# Patient Record
Sex: MALE | HISPANIC OR LATINO | Employment: UNEMPLOYED | URBAN - NONMETROPOLITAN AREA
[De-identification: names, ages, dates, MRNs, and addresses within clinical notes are randomized per-mention and may not be internally consistent; named-entity substitution may affect disease eponyms.]

---

## 2017-01-16 ENCOUNTER — HOSPITAL ENCOUNTER (OUTPATIENT)
Dept: OTHER | Facility: HOSPITAL | Age: 62
Discharge: HOME | End: 2017-01-16
Attending: NURSE PRACTITIONER | Admitting: NURSE PRACTITIONER

## 2017-07-18 ENCOUNTER — TRANSCRIBE ORDERS (OUTPATIENT)
Dept: CT IMAGING | Facility: HOSPITAL | Age: 62
End: 2017-07-18

## 2017-07-18 DIAGNOSIS — R51.9 INTRACTABLE HEADACHE, UNSPECIFIED CHRONICITY PATTERN, UNSPECIFIED HEADACHE TYPE: Primary | ICD-10-CM

## 2017-07-21 ENCOUNTER — HOSPITAL ENCOUNTER (OUTPATIENT)
Dept: CT IMAGING | Facility: HOSPITAL | Age: 62
Discharge: HOME | End: 2017-07-21
Admitting: NURSE PRACTITIONER

## 2017-07-21 DIAGNOSIS — R51.9 INTRACTABLE HEADACHE, UNSPECIFIED CHRONICITY PATTERN, UNSPECIFIED HEADACHE TYPE: ICD-10-CM

## 2017-07-21 PROCEDURE — 70450 CT HEAD/BRAIN W/O DYE: CPT

## 2017-10-27 DIAGNOSIS — M25.512 LEFT SHOULDER PAIN, UNSPECIFIED CHRONICITY: Primary | ICD-10-CM

## 2018-04-02 ENCOUNTER — HOSPITAL ENCOUNTER (INPATIENT)
Facility: HOSPITAL | Age: 63
LOS: 4 days | Discharge: HOME | End: 2018-04-06
Attending: EMERGENCY MEDICINE | Admitting: FAMILY MEDICINE

## 2018-04-02 ENCOUNTER — APPOINTMENT (OUTPATIENT)
Dept: GENERAL RADIOLOGY | Facility: HOSPITAL | Age: 63
End: 2018-04-02

## 2018-04-02 DIAGNOSIS — R07.9 CHEST PAIN, UNSPECIFIED TYPE: ICD-10-CM

## 2018-04-02 DIAGNOSIS — R35.0 BENIGN PROSTATIC HYPERPLASIA WITH URINARY FREQUENCY: ICD-10-CM

## 2018-04-02 DIAGNOSIS — N40.1 BENIGN PROSTATIC HYPERPLASIA WITH URINARY FREQUENCY: ICD-10-CM

## 2018-04-02 DIAGNOSIS — E87.6 HYPOKALEMIA: ICD-10-CM

## 2018-04-02 DIAGNOSIS — R53.1 GENERAL WEAKNESS: ICD-10-CM

## 2018-04-02 DIAGNOSIS — E87.1 HYPONATREMIA: Primary | ICD-10-CM

## 2018-04-02 PROBLEM — J32.1 SINUSITIS CHRONIC, FRONTAL: Status: ACTIVE | Noted: 2018-04-02

## 2018-04-02 PROBLEM — K21.9 GASTROESOPHAGEAL REFLUX DISEASE: Status: ACTIVE | Noted: 2018-04-02

## 2018-04-02 PROBLEM — I10 HYPERTENSION, BENIGN: Status: ACTIVE | Noted: 2018-04-02

## 2018-04-02 PROBLEM — N13.8 OTHER OBSTRUCTIVE AND REFLUX UROPATHY: Status: ACTIVE | Noted: 2018-04-02

## 2018-04-02 PROBLEM — I10 HYPERTENSION: Status: ACTIVE | Noted: 2018-04-02

## 2018-04-02 LAB
ALBUMIN SERPL-MCNC: 4.2 G/DL (ref 3.4–4.8)
ALBUMIN/GLOB SERPL: 1.6 G/DL (ref 1.1–1.8)
ALP SERPL-CCNC: 65 U/L (ref 38–126)
ALT SERPL W P-5'-P-CCNC: 152 U/L (ref 21–72)
ANION GAP SERPL CALCULATED.3IONS-SCNC: 13 MMOL/L (ref 5–15)
APTT PPP: 25.2 SECONDS (ref 20–40.3)
AST SERPL-CCNC: 97 U/L (ref 17–59)
BASOPHILS # BLD AUTO: 0.02 10*3/MM3 (ref 0–0.2)
BASOPHILS NFR BLD AUTO: 0.3 % (ref 0–2)
BILIRUB SERPL-MCNC: 1.4 MG/DL (ref 0.2–1.3)
BILIRUB UR QL STRIP: NEGATIVE
BUN BLD-MCNC: 8 MG/DL (ref 7–21)
BUN/CREAT SERPL: 13.3 (ref 7–25)
CALCIUM SPEC-SCNC: 8.8 MG/DL (ref 8.4–10.2)
CHLORIDE SERPL-SCNC: 72 MMOL/L (ref 95–110)
CLARITY UR: CLEAR
CO2 SERPL-SCNC: 29 MMOL/L (ref 22–31)
COLOR UR: YELLOW
CREAT BLD-MCNC: 0.6 MG/DL (ref 0.7–1.3)
DEPRECATED RDW RBC AUTO: 37.6 FL (ref 35.1–43.9)
EOSINOPHIL # BLD AUTO: 0.03 10*3/MM3 (ref 0–0.7)
EOSINOPHIL NFR BLD AUTO: 0.5 % (ref 0–7)
ERYTHROCYTE [DISTWIDTH] IN BLOOD BY AUTOMATED COUNT: 11.4 % (ref 11.5–14.5)
GFR SERPL CREATININE-BSD FRML MDRD: 137 ML/MIN/1.73 (ref 49–113)
GFR SERPL CREATININE-BSD FRML MDRD: 165 ML/MIN/1.73 (ref 49–113)
GLOBULIN UR ELPH-MCNC: 2.6 GM/DL (ref 2.3–3.5)
GLUCOSE BLD-MCNC: 102 MG/DL (ref 60–100)
GLUCOSE UR STRIP-MCNC: ABNORMAL MG/DL
HCT VFR BLD AUTO: 37.4 % (ref 39–49)
HGB BLD-MCNC: 14.8 G/DL (ref 13.7–17.3)
HGB UR QL STRIP.AUTO: NEGATIVE
HOLD SPECIMEN: NORMAL
HOLD SPECIMEN: NORMAL
IMM GRANULOCYTES # BLD: 0.03 10*3/MM3 (ref 0–0.02)
IMM GRANULOCYTES NFR BLD: 0.5 % (ref 0–0.5)
INR PPP: 0.89 (ref 0.8–1.2)
KETONES UR QL STRIP: ABNORMAL
LEUKOCYTE ESTERASE UR QL STRIP.AUTO: NEGATIVE
LIPASE SERPL-CCNC: 114 U/L (ref 23–300)
LYMPHOCYTES # BLD AUTO: 1.2 10*3/MM3 (ref 0.6–4.2)
LYMPHOCYTES NFR BLD AUTO: 19.4 % (ref 10–50)
MCH RBC QN AUTO: 35.9 PG (ref 26.5–34)
MCHC RBC AUTO-ENTMCNC: 39.6 G/DL (ref 31.5–36.3)
MCV RBC AUTO: 90.8 FL (ref 80–98)
MONOCYTES # BLD AUTO: 0.76 10*3/MM3 (ref 0–0.9)
MONOCYTES NFR BLD AUTO: 12.3 % (ref 0–12)
NEUTROPHILS # BLD AUTO: 4.14 10*3/MM3 (ref 2–8.6)
NEUTROPHILS NFR BLD AUTO: 67 % (ref 37–80)
NITRITE UR QL STRIP: NEGATIVE
NT-PROBNP SERPL-MCNC: 361 PG/ML (ref 0–900)
OSMOLALITY UR: 223 MOSM/KG (ref 38–1400)
PH UR STRIP.AUTO: 7.5 [PH] (ref 5–9)
PLAT MORPH BLD: NORMAL
PLATELET # BLD AUTO: 197 10*3/MM3 (ref 150–450)
PMV BLD AUTO: 9.8 FL (ref 8–12)
POTASSIUM BLD-SCNC: 2.3 MMOL/L (ref 3.5–5.1)
POTASSIUM BLD-SCNC: 3.1 MMOL/L (ref 3.5–5.1)
PROT SERPL-MCNC: 6.8 G/DL (ref 6.3–8.6)
PROT UR QL STRIP: NEGATIVE
PROTHROMBIN TIME: 11.9 SECONDS (ref 11.1–15.3)
RBC # BLD AUTO: 4.12 10*6/MM3 (ref 4.37–5.74)
RBC MORPH BLD: NORMAL
SODIUM BLD-SCNC: 114 MMOL/L (ref 137–145)
SODIUM BLD-SCNC: 115 MMOL/L (ref 137–145)
SODIUM BLD-SCNC: 117 MMOL/L (ref 137–145)
SODIUM UR-SCNC: 44 MMOL/L (ref 30–90)
SP GR UR STRIP: 1.01 (ref 1–1.03)
TROPONIN I SERPL-MCNC: <0.012 NG/ML
URATE SERPL-MCNC: 3.2 MG/DL (ref 2.5–8.5)
UROBILINOGEN UR QL STRIP: ABNORMAL
WBC MORPH BLD: NORMAL
WBC NRBC COR # BLD: 6.18 10*3/MM3 (ref 3.2–9.8)
WHOLE BLOOD HOLD SPECIMEN: NORMAL
WHOLE BLOOD HOLD SPECIMEN: NORMAL

## 2018-04-02 PROCEDURE — 94760 N-INVAS EAR/PLS OXIMETRY 1: CPT

## 2018-04-02 PROCEDURE — 84484 ASSAY OF TROPONIN QUANT: CPT | Performed by: EMERGENCY MEDICINE

## 2018-04-02 PROCEDURE — 25010000003 POTASSIUM CHLORIDE 10 MEQ/100ML SOLUTION: Performed by: EMERGENCY MEDICINE

## 2018-04-02 PROCEDURE — 99221 1ST HOSP IP/OBS SF/LOW 40: CPT | Performed by: STUDENT IN AN ORGANIZED HEALTH CARE EDUCATION/TRAINING PROGRAM

## 2018-04-02 PROCEDURE — 93010 ELECTROCARDIOGRAM REPORT: CPT | Performed by: INTERNAL MEDICINE

## 2018-04-02 PROCEDURE — 84295 ASSAY OF SERUM SODIUM: CPT | Performed by: INTERNAL MEDICINE

## 2018-04-02 PROCEDURE — 85007 BL SMEAR W/DIFF WBC COUNT: CPT | Performed by: EMERGENCY MEDICINE

## 2018-04-02 PROCEDURE — 83935 ASSAY OF URINE OSMOLALITY: CPT | Performed by: EMERGENCY MEDICINE

## 2018-04-02 PROCEDURE — 85025 COMPLETE CBC W/AUTO DIFF WBC: CPT | Performed by: EMERGENCY MEDICINE

## 2018-04-02 PROCEDURE — 85730 THROMBOPLASTIN TIME PARTIAL: CPT | Performed by: EMERGENCY MEDICINE

## 2018-04-02 PROCEDURE — 85610 PROTHROMBIN TIME: CPT | Performed by: EMERGENCY MEDICINE

## 2018-04-02 PROCEDURE — 99285 EMERGENCY DEPT VISIT HI MDM: CPT

## 2018-04-02 PROCEDURE — 84550 ASSAY OF BLOOD/URIC ACID: CPT | Performed by: INTERNAL MEDICINE

## 2018-04-02 PROCEDURE — 93005 ELECTROCARDIOGRAM TRACING: CPT | Performed by: STUDENT IN AN ORGANIZED HEALTH CARE EDUCATION/TRAINING PROGRAM

## 2018-04-02 PROCEDURE — 83690 ASSAY OF LIPASE: CPT | Performed by: EMERGENCY MEDICINE

## 2018-04-02 PROCEDURE — 84300 ASSAY OF URINE SODIUM: CPT | Performed by: EMERGENCY MEDICINE

## 2018-04-02 PROCEDURE — 83880 ASSAY OF NATRIURETIC PEPTIDE: CPT | Performed by: EMERGENCY MEDICINE

## 2018-04-02 PROCEDURE — 82533 TOTAL CORTISOL: CPT | Performed by: INTERNAL MEDICINE

## 2018-04-02 PROCEDURE — 80053 COMPREHEN METABOLIC PANEL: CPT | Performed by: EMERGENCY MEDICINE

## 2018-04-02 PROCEDURE — 74022 RADEX COMPL AQT ABD SERIES: CPT

## 2018-04-02 PROCEDURE — 81003 URINALYSIS AUTO W/O SCOPE: CPT | Performed by: EMERGENCY MEDICINE

## 2018-04-02 PROCEDURE — 84132 ASSAY OF SERUM POTASSIUM: CPT | Performed by: INTERNAL MEDICINE

## 2018-04-02 RX ORDER — TAMSULOSIN HYDROCHLORIDE 0.4 MG/1
0.4 CAPSULE ORAL EVERY 24 HOURS
Status: DISCONTINUED | OUTPATIENT
Start: 2018-04-03 | End: 2018-04-06 | Stop reason: HOSPADM

## 2018-04-02 RX ORDER — ACETAMINOPHEN 325 MG/1
650 TABLET ORAL EVERY 4 HOURS PRN
Status: DISCONTINUED | OUTPATIENT
Start: 2018-04-02 | End: 2018-04-06 | Stop reason: HOSPADM

## 2018-04-02 RX ORDER — TAMSULOSIN HYDROCHLORIDE 0.4 MG/1
0.4 CAPSULE ORAL EVERY 24 HOURS
COMMUNITY
Start: 2018-01-11

## 2018-04-02 RX ORDER — AMOXICILLIN 500 MG/1
500 CAPSULE ORAL 2 TIMES DAILY
COMMUNITY

## 2018-04-02 RX ORDER — 3% SODIUM CHLORIDE 3 G/100ML
20 INJECTION, SOLUTION INTRAVENOUS ONCE
Status: COMPLETED | OUTPATIENT
Start: 2018-04-02 | End: 2018-04-02

## 2018-04-02 RX ORDER — POTASSIUM CHLORIDE 750 MG/1
40 CAPSULE, EXTENDED RELEASE ORAL ONCE
Status: COMPLETED | OUTPATIENT
Start: 2018-04-02 | End: 2018-04-02

## 2018-04-02 RX ORDER — SODIUM CHLORIDE 9 MG/ML
125 INJECTION, SOLUTION INTRAVENOUS CONTINUOUS
Status: DISCONTINUED | OUTPATIENT
Start: 2018-04-02 | End: 2018-04-05

## 2018-04-02 RX ORDER — POTASSIUM CHLORIDE 7.45 MG/ML
10 INJECTION INTRAVENOUS
Status: COMPLETED | OUTPATIENT
Start: 2018-04-02 | End: 2018-04-02

## 2018-04-02 RX ORDER — ONDANSETRON 4 MG/1
4 TABLET, FILM COATED ORAL EVERY 6 HOURS PRN
Status: DISCONTINUED | OUTPATIENT
Start: 2018-04-02 | End: 2018-04-06 | Stop reason: HOSPADM

## 2018-04-02 RX ORDER — LISINOPRIL AND HYDROCHLOROTHIAZIDE 12.5; 1 MG/1; MG/1
1 TABLET ORAL EVERY 24 HOURS
COMMUNITY
Start: 2017-05-15 | End: 2018-04-06 | Stop reason: HOSPADM

## 2018-04-02 RX ORDER — HYDRALAZINE HYDROCHLORIDE 20 MG/ML
20 INJECTION INTRAMUSCULAR; INTRAVENOUS EVERY 4 HOURS PRN
Status: DISCONTINUED | OUTPATIENT
Start: 2018-04-02 | End: 2018-04-06 | Stop reason: HOSPADM

## 2018-04-02 RX ORDER — PANTOPRAZOLE SODIUM 40 MG/1
40 TABLET, DELAYED RELEASE ORAL
Status: DISCONTINUED | OUTPATIENT
Start: 2018-04-02 | End: 2018-04-06 | Stop reason: HOSPADM

## 2018-04-02 RX ORDER — SODIUM CHLORIDE 0.9 % (FLUSH) 0.9 %
1-10 SYRINGE (ML) INJECTION AS NEEDED
Status: DISCONTINUED | OUTPATIENT
Start: 2018-04-02 | End: 2018-04-06 | Stop reason: HOSPADM

## 2018-04-02 RX ADMIN — SODIUM CHLORIDE 70 ML/HR: 9 INJECTION, SOLUTION INTRAVENOUS at 21:17

## 2018-04-02 RX ADMIN — POTASSIUM CHLORIDE 10 MEQ: 10 INJECTION, SOLUTION INTRAVENOUS at 15:26

## 2018-04-02 RX ADMIN — POTASSIUM CHLORIDE 10 MEQ: 10 INJECTION, SOLUTION INTRAVENOUS at 16:48

## 2018-04-02 RX ADMIN — POTASSIUM CHLORIDE 40 MEQ: 750 CAPSULE, EXTENDED RELEASE ORAL at 15:26

## 2018-04-02 RX ADMIN — PANTOPRAZOLE SODIUM 40 MG: 40 TABLET, DELAYED RELEASE ORAL at 21:20

## 2018-04-02 RX ADMIN — SODIUM CHLORIDE 1000 ML: 900 INJECTION, SOLUTION INTRAVENOUS at 15:18

## 2018-04-02 RX ADMIN — POTASSIUM CHLORIDE 10 MEQ: 10 INJECTION, SOLUTION INTRAVENOUS at 17:20

## 2018-04-02 RX ADMIN — POTASSIUM CHLORIDE 10 MEQ: 10 INJECTION, SOLUTION INTRAVENOUS at 19:00

## 2018-04-02 RX ADMIN — SODIUM CHLORIDE 20 ML/HR: 3 INJECTION, SOLUTION INTRAVENOUS at 15:54

## 2018-04-02 NOTE — H&P
HISTORY AND PHYSICAL  NAME: Barron Ackerman  : 1955  MRN: [id number]    DATE OF ADMISSION: 18    DATE & TIME SEEN: 18 6:51 PM    PCP: ERNST Celestin    CODE STATUS: Full    CHIEF COMPLAINT vomiting    HPI:  Barron Ackerman is a 62 y.o. male for the last 2 weeks has had flulike symptoms including muscle aches, joint aches, cough, nasal congestion, rhinorrhea, cough, with some nausea and vomiting.  He has not been able to keep anything down for for 2 weeks.  He has been able to drink approximately 36 ounces of water a day.  Patient denies any fevers or chills.  He has had a couple episodes of diarrhea.  He becomes lightheaded and dizzy when he stands up, this started yesterday.  For the past 2-3 days he's had worsening cough is causing chest pain.  He went to the doctor's today who did an EKG and sent him to the ER via EMS.  Patient has not tried anything at home medication wise to help with his symptoms.  He reports having lost 12 pounds in the last 2 weeks.    In the ER he was found to have a sodium of 114 and potassium of 2.3.  He was given 40 KCl by mouth and 10 IV.  Patient started on IV 20cc/hr hypertonic saline.nephrology was consulted Nephrology was consulted.  EKG showed normal sinus rhythm, troponin was negative.     CONCURRENT MEDICAL HISTORY:  Past Medical History:   Diagnosis Date   • Hypertension        PAST SURGICAL HISTORY:  Past Surgical History:   Procedure Laterality Date   • HERNIA REPAIR         FAMILY HISTORY:  History reviewed. No pertinent family history.     SOCIAL HISTORY:  Social History     Social History   • Marital status:      Spouse name: N/A   • Number of children: N/A   • Years of education: N/A     Occupational History   • Not on file.     Social History Main Topics   • Smoking status: Current Some Day Smoker   • Smokeless tobacco: Never Used   • Alcohol use Yes      Comment: occassional   • Drug use: No   • Sexual activity: Not on file     Other  Topics Concern   • Not on file     Social History Narrative   • No narrative on file       HOME MEDICATIONS:  Prescriptions Prior to Admission   Medication Sig Dispense Refill Last Dose   • esomeprazole (NEXIUM) 40 MG capsule Take 40 mg by mouth Daily.   4/2/2018 at 0900   • lisinopril-hydrochlorothiazide (PRINZIDE,ZESTORETIC) 10-12.5 MG per tablet Take 1 tablet by mouth Daily.   4/2/2018 at 0900   • tamsulosin (FLOMAX) 0.4 MG capsule 24 hr capsule Take 0.4 mg by mouth Daily.      • amoxicillin (AMOXIL) 500 MG capsule Take 500 mg by mouth 2 (Two) Times a Day. For 10 days. Per pt/wife, started taking about 4 days ago. Pt has only been taking once daily.   Unknown at Unknown time       ALLERGIES:  Review of patient's allergies indicates no known allergies.    REVIEW OF SYSTEMS  Review of Systems   Constitutional: Positive for activity change, appetite change, fatigue and unexpected weight change. Negative for chills, diaphoresis and fever.   HENT: Positive for congestion, rhinorrhea and sneezing. Negative for ear pain and sore throat.    Eyes: Negative for pain, redness, itching and visual disturbance.   Respiratory: Positive for cough. Negative for choking, chest tightness, shortness of breath, wheezing and stridor.    Cardiovascular: Positive for chest pain. Negative for palpitations and leg swelling.   Gastrointestinal: Positive for diarrhea, nausea and vomiting. Negative for abdominal distention, abdominal pain, blood in stool, constipation and rectal pain.   Genitourinary: Negative for difficulty urinating, dysuria, flank pain and frequency.   Skin: Negative for color change and rash.   Neurological: Positive for dizziness, weakness and light-headedness. Negative for tremors, seizures, syncope, facial asymmetry, speech difficulty, numbness and headaches.   Psychiatric/Behavioral: Negative for agitation, confusion, decreased concentration and sleep disturbance. The patient is not nervous/anxious.    All other  systems reviewed and are negative.      PHYSICAL EXAM:  Temp:  [98.5 °F (36.9 °C)] 98.5 °F (36.9 °C)  Heart Rate:  [80-87] 85  Resp:  [18] 18  BP: (101-135)/(61-85) 135/80  Body mass index is 22.74 kg/m².     Physical Exam   Constitutional: He is oriented to person, place, and time. He appears well-developed and well-nourished.  Non-toxic appearance. He does not have a sickly appearance. He appears ill. No distress.   HENT:   Head: Normocephalic and atraumatic.   Right Ear: External ear normal. No lacerations. No swelling or tenderness.   Left Ear: External ear normal. No lacerations. No swelling or tenderness.   Nose: Mucosal edema present. Right sinus exhibits no maxillary sinus tenderness and no frontal sinus tenderness. Left sinus exhibits no maxillary sinus tenderness and no frontal sinus tenderness.   Mouth/Throat: Uvula is midline, oropharynx is clear and moist and mucous membranes are normal.   Eyes: Conjunctivae, EOM and lids are normal. Pupils are equal, round, and reactive to light. No scleral icterus.   Neck: No tracheal deviation present. No thyromegaly present.   Cardiovascular: Normal rate, regular rhythm, normal heart sounds and intact distal pulses.  Exam reveals no gallop, no distant heart sounds and no friction rub.    No murmur heard.  Pulses:       Carotid pulses are 2+ on the right side, and 2+ on the left side.       Radial pulses are 2+ on the right side, and 2+ on the left side.        Dorsalis pedis pulses are 2+ on the right side, and 2+ on the left side.        Posterior tibial pulses are 2+ on the right side, and 2+ on the left side.   Pulmonary/Chest: Effort normal. No accessory muscle usage or stridor. No respiratory distress. He has no decreased breath sounds. He has no wheezes. He has rhonchi in the left lower field. He has no rales. He exhibits no tenderness.   Abdominal: Soft. Bowel sounds are normal. He exhibits no shifting dullness, no distension and no ascites. There is no  tenderness. There is no rigidity, no rebound and no guarding.   Musculoskeletal:        Right lower leg: He exhibits no swelling.        Left lower leg: He exhibits no swelling.        Right foot: There is no swelling.        Left foot: There is no swelling.     Vascular Status -  His right foot exhibits normal foot vasculature  and no edema. His left foot exhibits normal foot vasculature  and no edema.  Lymphadenopathy:     He has no cervical adenopathy.   Neurological: He is alert and oriented to person, place, and time. He has normal strength. No cranial nerve deficit or sensory deficit. He exhibits normal muscle tone. GCS eye subscore is 4. GCS verbal subscore is 5. GCS motor subscore is 6.   Skin: Skin is warm and dry. Capillary refill takes less than 2 seconds. No rash noted. He is not diaphoretic. No erythema. No pallor.   Psychiatric: He has a normal mood and affect. His speech is normal.   Nursing note and vitals reviewed.      DIAGNOSTIC DATA:   Lab Results (last 24 hours)     Procedure Component Value Units Date/Time    Sodium [[id number]]  (Abnormal) Collected:  04/02/18 1804    Specimen:  Blood Updated:  04/02/18 1835     Sodium 115 (C) mmol/L     Uric Acid [[id number]]  (Normal) Collected:  04/02/18 1804    Specimen:  Blood Updated:  04/02/18 1830     Uric Acid 3.2 mg/dL     Osmolality, Urine - Urine, Clean Catch [[id number]]  (Normal) Collected:  04/02/18 1639    Specimen:  Urine from Urine, Clean Catch Updated:  04/02/18 1704     Osmolality, Urine 223 mOsm/kg     Sodium, Urine, Random - Urine, Clean Catch [[id number]]  (Normal) Collected:  04/02/18 1639    Specimen:  Urine from Urine, Clean Catch Updated:  04/02/18 1656     Sodium, Urine 44 mmol/L     Urinalysis With / Culture If Indicated - Urine, Clean Catch [[id number]]  (Abnormal) Collected:  04/02/18 1639    Specimen:  Urine from Urine, Clean Catch Updated:  04/02/18 1643     Color, UA Yellow     Appearance, UA Clear     pH, UA 7.5     Specific Gravity,  UA 1.007     Glucose,  mg/dL (Trace) (A)     Ketones, UA Trace (A)     Bilirubin, UA Negative     Blood, UA Negative     Protein, UA Negative     Leuk Esterase, UA Negative     Nitrite, UA Negative     Urobilinogen, UA 1.0 E.U./dL    Narrative:       Urine microscopic not indicated.    CBC & Differential [[id number]] Collected:  04/02/18 1431    Specimen:  Blood Updated:  04/02/18 1608    Narrative:       The following orders were created for panel order CBC & Differential.  Procedure                               Abnormality         Status                     ---------                               -----------         ------                     Scan Slide[[id number]]                    Normal              Final result               CBC Auto Differential[[id number]]         Abnormal            Final result                 Please view results for these tests on the individual orders.    Scan Slide [[id number]]  (Normal) Collected:  04/02/18 1431    Specimen:  Blood Updated:  04/02/18 1608     RBC Morphology Normal     WBC Morphology Normal     Platelet Morphology Normal    [place] Draw [[id number]] Collected:  04/02/18 1431    Specimen:  Blood Updated:  04/02/18 1546    Narrative:       The following orders were created for panel order [place] Draw.  Procedure                               Abnormality         Status                     ---------                               -----------         ------                     Light Blue Top[[id number]]                                    Final result               Green Top (Gel)[[id number]]                                   Final result               Lavender Top[[id number]]                                      Final result               Gold Top - SST[[id number]]                                    Final result                 Please view results for these tests on the individual orders.    Light Blue Top [[id number]] Collected:  04/02/18 1431    Specimen:  Blood Updated:  04/02/18  1546     Extra Tube hold for add-on     Comment: Auto resulted       Green Top (Gel) [[id number]] Collected:  04/02/18 1431    Specimen:  Blood Updated:  04/02/18 1546     Extra Tube Hold for add-ons.     Comment: Auto resulted.       Lavender Top [[id number]] Collected:  04/02/18 1431    Specimen:  Blood Updated:  04/02/18 1546     Extra Tube hold for add-on     Comment: Auto resulted       Gold Top - SST [[id number]] Collected:  04/02/18 1431    Specimen:  Blood Updated:  04/02/18 1546     Extra Tube Hold for add-ons.     Comment: Auto resulted.       BNP [[id number]]  (Normal) Collected:  04/02/18 1431    Specimen:  Blood Updated:  04/02/18 1519     proBNP 361.0 pg/mL     Troponin [[id number]]  (Normal) Collected:  04/02/18 1431    Specimen:  Blood Updated:  04/02/18 1519     Troponin I <0.012 ng/mL     Comprehensive Metabolic Panel [[id number]]  (Abnormal) Collected:  04/02/18 1431    Specimen:  Blood Updated:  04/02/18 1518     Glucose 102 (H) mg/dL      BUN 8 mg/dL      Creatinine 0.60 (L) mg/dL      Sodium 114 (C) mmol/L      Comment: RERAN TO CONFIRM RESULT        Potassium 2.3 (C) mmol/L      Comment: RERAN TO CONFIRM RESULT        Chloride 72 (L) mmol/L      CO2 29.0 mmol/L      Calcium 8.8 mg/dL      Total Protein 6.8 g/dL      Albumin 4.20 g/dL      ALT (SGPT) 152 (H) U/L      AST (SGOT) 97 (H) U/L      Alkaline Phosphatase 65 U/L      Total Bilirubin 1.4 (H) mg/dL      eGFR Non African Amer 137 (H) mL/min/1.73      eGFR  African Amer 165 (H) mL/min/1.73      Globulin 2.6 gm/dL      A/G Ratio 1.6 g/dL      BUN/Creatinine Ratio 13.3     Anion Gap 13.0 mmol/L     Protime-INR [[id number]]  (Normal) Collected:  04/02/18 1431    Specimen:  Blood Updated:  04/02/18 1516     Protime 11.9 Seconds      INR 0.89    Narrative:       Therapeutic range for most indications is 2.0-3.0 INR,  or 2.5-3.5 for mechanical heart valves.    aPTT [[id number]]  (Normal) Collected:  04/02/18 1431    Specimen:  Blood Updated:  04/02/18 8543      PTT 25.2 seconds     Narrative:       The recommended Heparin therapeutic range is 68-97 seconds.    Lipase [[id number]]  (Normal) Collected:  04/02/18 1431    Specimen:  Blood Updated:  04/02/18 1509     Lipase 114 U/L     CBC Auto Differential [[id number]]  (Abnormal) Collected:  04/02/18 1431    Specimen:  Blood Updated:  04/02/18 1458     WBC 6.18 10*3/mm3      RBC 4.12 (L) 10*6/mm3      Hemoglobin 14.8 g/dL      Hematocrit 37.4 (L) %      MCV 90.8 fL      MCH 35.9 (H) pg      MCHC 39.6 (H) g/dL      RDW 11.4 (L) %      RDW-SD 37.6 fl      MPV 9.8 fL      Platelets 197 10*3/mm3      Neutrophil % 67.0 %      Lymphocyte % 19.4 %      Monocyte % 12.3 (H) %      Eosinophil % 0.5 %      Basophil % 0.3 %      Immature Grans % 0.5 %      Neutrophils, Absolute 4.14 10*3/mm3      Lymphocytes, Absolute 1.20 10*3/mm3      Monocytes, Absolute 0.76 10*3/mm3      Eosinophils, Absolute 0.03 10*3/mm3      Basophils, Absolute 0.02 10*3/mm3      Immature Grans, Absolute 0.03 (H) 10*3/mm3            Imaging Results (last 24 hours)     Procedure Component Value Units Date/Time    XR Abdomen 2 View With Chest 1 View [[id number]] Collected:  04/02/18 1436     Updated:  04/02/18 1501    Narrative:         EXAMINATION:  Acute abdominal series              VIEWS:  Chest:  1 ; Abdo:  2   DATE/TIME:     4/2/2018 2:59 PM CDT      INDICATION: vomiting , chest pain ,  COMPARISON EXAMINATION:    none                              FINDINGS:                             Chest:      heart size: within normal limits      mediastinal contour: within normal limits       lungs: no evidence of focal air space disease, grossly  negative      pleura:  no pleural fluid      osseous: limited, grossly negative for age.      misc:    Abdomen:      bowel gas pattern: nonobstructive      free air:  none visualized      calculi:   none visualized      osseous:  limited, grossly negative for age.      misc:        Impression:       CONCLUSION:        1. Negative  examination.                                If pain or symptoms persist beyond reasonable expectations, a  contrast enhanced CT examination is suggested, as is deemed  clinical appropriate.                       Electronically signed by:  JOSE Cunha MD  4/2/2018 3:00 PM  CDT Workstation: [id number]          I reviewed the patient's new clinical results.    ASSESSMENT AND PLAN: This is a 62 y.o. male with:    Active Hospital Problems (** Indicates Principal Problem)    Diagnosis Date Noted   • **Hyponatremia [E87.1] 04/02/2018     Priority: High     Na 114 today in the ER. 2 week hx of vomiting with no food intake.  -Nephrology consulted. Will correct the sodium 8-10 MG daily point per day.  -ER started 200cc bolus hypertonic saline.  -Urine Na, Osmolality, and creatinine pending.  -Uric acid and TSH and 8 AM cortisol.  -Rechecking sodium levels this evening, nephrology will most likely hold hypertonic saline and start patient on normal saline.     • Hypokalemia [E87.6] 04/02/2018     Priority: High     Potassium 2.3 in the ER on admission.  - EKG unremarkable.  - 40meq PO and 10 IV given in the ER.  - Give additional 80meq tonight PO.  -ICU on Tele.  -Nephrology consulted.     • Hypertension [I10] 04/02/2018     -Stable  -Continue home med of Prinzide.     • Gastroesophageal reflux disease [K21.9] 04/02/2018     Stable  -Continue home medication PPI.     • Benign prostatic hyperplasia with lower urinary tract symptoms [N40.1] 04/02/2018     -Continue Flomax from home.        Resolved Hospital Problems    Diagnosis Date Noted Date Resolved   No resolved problems to display.     DVT prophylaxis: SCDs/TEDs  Code status is Full  [facility] # [id number], reviewed and consistent with patient reported medications.    I discussed the patients findings and my recommendations with patient and family.     Ruperto is the attending on record at time of admission, is aware of the patient's status and agrees with the above history  and physical.        This document has been electronically signed by Janusz Patrick MD on April 2, 2018 6:51 PM

## 2018-04-02 NOTE — ED NOTES
3% saline paused per Dr. Pimentel until urine osmolality is collected. Will restart after collection of urine.      Vera Watson RN  04/02/18 0490

## 2018-04-02 NOTE — CONSULTS
[facility]  [address] [place] [address]. [id number]   - [phone]  F  [phone]     Consultation         PATIENT  DEMOGRAPHICS   PATIENT NAME: Barron Ackerman                      PHYSICIAN: Gilma Britt MD  : 1955  MRN: [id number]    Subjective   SUBJECTIVE   Referring Provider: Dr Pimentel  Reason for Consultation: hyponatremia  History of present illness:     Mr. Ackerman is a 62 year gentleman with a past medical history of hypertension.  He is chronically on lisinopril hydrochlorothiazide and he is taking it for quite some time.  Patient came here with the 2 weeks history of generalized weakness along with the fatigue.  He also has poor oral intake.  He has been vomiting at least 1-2 times in a day along with the watery diarrhea at least one to twice a day.  He continues to drink at least 6 beers a day.  Patient also lost at least 12 pounds in the last few weeks.  He smokes at least a half a pack a day for nearly 40 years.    We have been asked to evaluate for sodium of 114.  Because of his symptoms we have suggested hypertonic saline but this is on hold until the urine studies are back.  Patient has received 200 cc of normal saline in the ER.    Past Medical History:   Diagnosis Date   • Hypertension      Past Surgical History:   Procedure Laterality Date   • HERNIA REPAIR       History reviewed. No pertinent family history.  Social History   Substance Use Topics   • Smoking status: Current Some Day Smoker   • Smokeless tobacco: Never Used   • Alcohol use Yes      Comment: occassional     Allergies:  Review of patient's allergies indicates no known allergies.     REVIEW OF SYSTEMS    Review of Systems   Constitutional: Positive for fatigue and unexpected weight change. Negative for chills and fever.   Respiratory: Negative for chest tightness and shortness of breath.    Cardiovascular: Negative for chest pain and leg swelling.   Gastrointestinal: Positive for diarrhea and  "nausea. Negative for abdominal pain.   Genitourinary: Negative for dysuria, flank pain and hematuria.   Neurological: Positive for weakness. Negative for dizziness and syncope.       Objective   OBJECTIVE   Vital Signs  Temp:  [98.5 °F (36.9 °C)] 98.5 °F (36.9 °C)  Heart Rate:  [80-87] 80  Resp:  [18] 18  BP: (101-132)/(61-80) 132/80    Flowsheet Rows    Flowsheet Row First Filed Value   Admission Height 177.8 cm (70\") Documented at 04/02/2018 1423   Admission Weight 75.8 kg (167 lb) Documented at 04/02/2018 1423           No intake/output data recorded.    PHYSICAL EXAM    Physical Exam   Constitutional: He is oriented to person, place, and time. He appears well-developed.   HENT:   Head: Normocephalic.   Eyes: Pupils are equal, round, and reactive to light.   Cardiovascular: Normal rate, regular rhythm and normal heart sounds.    Pulmonary/Chest: Effort normal and breath sounds normal.   Abdominal: Soft. Bowel sounds are normal.   Neurological: He is alert and oriented to person, place, and time.       RESULTS   Results Review:      Results from last 7 days  Lab Units 04/02/18  1431   SODIUM mmol/L 114*   POTASSIUM mmol/L 2.3*   CHLORIDE mmol/L 72*   CO2 mmol/L 29.0   BUN mg/dL 8   CREATININE mg/dL 0.60*   CALCIUM mg/dL 8.8   BILIRUBIN mg/dL 1.4*   ALK PHOS U/L 65   ALT (SGPT) U/L 152*   AST (SGOT) U/L 97*   GLUCOSE mg/dL 102*       Estimated Creatinine Clearance: 129.8 mL/min (by C-G formula based on SCr of 0.6 mg/dL (L)).                  Results from last 7 days  Lab Units 04/02/18  1431   WBC 10*3/mm3 6.18   HEMOGLOBIN g/dL 14.8   PLATELETS 10*3/mm3 197         Results from last 7 days  Lab Units 04/02/18  1431   INR  0.89        MEDICATIONS      potassium chloride 10 mEq Intravenous Q1H        Prescriptions Prior to Admission   Medication Sig Dispense Refill Last Dose   • esomeprazole (NEXIUM) 40 MG capsule Take 40 mg by mouth Daily.   4/2/2018 at 0900   • lisinopril-hydrochlorothiazide (PRINZIDE,ZESTORETIC) " 10-12.5 MG per tablet Take 1 tablet by mouth Daily.   4/2/2018 at 0900   • amoxicillin (AMOXIL) 500 MG capsule Take 500 mg by mouth 2 (Two) Times a Day. For 10 days. Per pt/wife, started taking about 4 days ago. Pt has only been taking once daily.   Unknown at Unknown time     Assessment/Plan   ASSESSMENT / PLAN    Active Problems:    Hyponatremia    1.hyponatremia.  Patient urine osmolality is not extremely high and appears lower than serum osmolality.  I believe he is hypovolemic in the setting of recent illness vomiting diarrhea and also take superimposed HCTZ in the background lead to more hypovolemic picture.  He also has weight loss and prior history of smoking.    I agree with holding the 3% saline we'll check the sodium now and start him likely on normal saline.  I will get the chest x-ray in the morning.  I will add uric acid and TSH to finish the workup of hyponatremia.  We'll also check 8 AM cortisol.  Will correct the sodium 8-10 MEQ point per day.    2.history of hypertension patient lisinopril and hctz will be on hold    Thank you for the referral will continue follow the patient during his hospital stay        I discussed the patients findings and my recommendations with patient and family         This document has been electronically signed by Gilma Britt MD on April 2, 2018 6:11 PM

## 2018-04-02 NOTE — ED PROVIDER NOTES
Subjective   62 years old male with history of acid reflux, hypertension is brought in the ER by EMS from primary care office with a chief complaint of upper back pain and some chest tightness.  He is given aspirin and one  nitroglycerin which improved his symptoms.  Patient reports that he is has been having on and off vomiting daily ×1 for almost 1 week.  No hematemesis.  He has been feeling weak and tired all over after he got a flu couple of weeks ago.  He was feeling dizzy and lightheaded yesterday while he was out.  He is not dizzy or lightheaded now.  He does not have any chest pain at present. Patient report pain in the upper back with some chest tightness.  No shortness of breath.  No limitations.  No fever or chills reported.        History provided by:  Patient and EMS personnel  Chest Pain   Chest pain location: Upper back.  Pain quality: dull    Pain radiates to:  Does not radiate  Pain severity:  Moderate  Onset quality:  Sudden  Timing:  Constant  Progression:  Resolved  Chronicity:  New  Relieved by:  Aspirin and nitroglycerin  Worsened by:  Nothing  Associated symptoms: back pain, dizziness and fatigue    Associated symptoms: no abdominal pain, no nausea, no numbness, no palpitations, no shortness of breath and no weakness    Dizziness:     Severity:  Mild    Duration:  1 day    Timing:  Constant    Progression:  Resolved  Risk factors: male sex and smoking        Review of Systems   Constitutional: Positive for fatigue. Negative for chills.   HENT: Negative for congestion, ear pain and sinus pressure.    Eyes: Negative for pain.   Respiratory: Positive for chest tightness. Negative for shortness of breath and wheezing.    Cardiovascular: Negative for chest pain and palpitations.   Gastrointestinal: Negative for abdominal distention, abdominal pain and nausea.   Genitourinary: Negative for flank pain.   Musculoskeletal: Positive for back pain.   Neurological: Positive for dizziness. Negative for  speech difficulty, weakness and numbness.   Hematological: Negative for adenopathy.   Psychiatric/Behavioral: Negative for agitation.     Hospital in the  Past Medical History:   Diagnosis Date   • Hypertension        No Known Allergies    Past Surgical History:   Procedure Laterality Date   • HERNIA REPAIR         History reviewed. No pertinent family history.    Social History     Social History   • Marital status:      Social History Main Topics   • Smoking status: Current Some Day Smoker   • Smokeless tobacco: Never Used   • Alcohol use Yes      Comment: occassional   • Drug use: No     Other Topics Concern   • Not on file           Objective   Physical Exam   Constitutional: He is oriented to person, place, and time. He appears well-developed and well-nourished.   HENT:   Head: Normocephalic and atraumatic.   Nose: Nose normal.   Mouth/Throat: Oropharynx is clear and moist.   Eyes: Conjunctivae are normal.   Neck: Normal range of motion. Neck supple.   Cardiovascular: Normal rate, regular rhythm and normal heart sounds.    Pulmonary/Chest: Effort normal and breath sounds normal.   Abdominal: Soft. Bowel sounds are normal. He exhibits no distension. There is no guarding.   Musculoskeletal: Normal range of motion.   Neurological: He is alert and oriented to person, place, and time. He has normal reflexes. He displays normal reflexes. No cranial nerve deficit or sensory deficit. He exhibits normal muscle tone. Coordination normal. GCS eye subscore is 4. GCS verbal subscore is 5. GCS motor subscore is 6. He displays no Babinski's sign on the right side. He displays no Babinski's sign on the left side.   Reflex Scores:       Bicep reflexes are 2+ on the right side and 2+ on the left side.       Patellar reflexes are 2+ on the right side and 2+ on the left side.  Skin: Skin is warm and dry. Capillary refill takes less than 2 seconds.   Psychiatric: He has a normal mood and affect.   Nursing note and vitals  reviewed.      ECG 12 Lead    Date/Time: 4/2/2018 3:36 PM  Performed by: ADARSH DAMIAN  Authorized by: ADARSH DAMIAN   Interpreted by physician  Rhythm: sinus rhythm  Rate: normal  BPM: 85  QRS axis: normal  Conduction: right bundle branch block  T depression: V1 and V2  Clinical impression: abnormal ECG               ED Course  ED Course                  MDM  Number of Diagnoses or Management Options  Chest pain, unspecified type:   General weakness:   Hypokalemia:   Hyponatremia:   Diagnosis management comments: 62 years old male is evaluated in the ER for generalized weakness/fatigue with vomiting and chest discomfort.  He is given IV fluid bolus.  EKG shows normal sinus rhythm with right bundle branch block and no acute elevation.  Has negative initial troponin.  Chemistry profile showed marked hypokalemia and hyponatremia which explain his symptoms.  Discussed with nephrology Dr. Britt, started on 3% normal saline at 20/h.  We will replace potassium also.  I have discussed with Dr. Brewster resident on call and patient is being admitted to ICU       Amount and/or Complexity of Data Reviewed  Clinical lab tests: ordered and reviewed  Tests in the radiology section of CPT®: ordered and reviewed      Labs Reviewed   COMPREHENSIVE METABOLIC PANEL - Abnormal; Notable for the following:        Result Value    Glucose 102 (*)     Creatinine 0.60 (*)     Sodium 114 (*)     Potassium 2.3 (*)     Chloride 72 (*)     ALT (SGPT) 152 (*)     AST (SGOT) 97 (*)     Total Bilirubin 1.4 (*)     eGFR Non  Amer 137 (*)     eGFR   Amer 165 (*)     All other components within normal limits   URINALYSIS W/ CULTURE IF INDICATED - Abnormal; Notable for the following:     Glucose,  mg/dL (Trace) (*)     Ketones, UA Trace (*)     All other components within normal limits    Narrative:     Urine microscopic not indicated.   CBC WITH AUTO DIFFERENTIAL - Abnormal; Notable for the following:     RBC 4.12 (*)      Hematocrit 37.4 (*)     MCH 35.9 (*)     MCHC 39.6 (*)     RDW 11.4 (*)     Monocyte % 12.3 (*)     Immature Grans, Absolute 0.03 (*)     All other components within normal limits   PROTIME-INR - Normal    Narrative:     Therapeutic range for most indications is 2.0-3.0 INR,  or 2.5-3.5 for mechanical heart valves.   APTT - Normal    Narrative:     The recommended Heparin therapeutic range is 68-97 seconds.   LIPASE - Normal   BNP (IN-HOUSE) - Normal   TROPONIN (IN-HOUSE) - Normal   SCAN SLIDE - Normal   RAINBOW DRAW    Narrative:     The following orders were created for panel order [place] Draw.  Procedure                               Abnormality         Status                     ---------                               -----------         ------                     Light Blue Top[[id number]]                                    Final result               Green Top (Gel)[[id number]]                                   Final result               Lavender Top[[id number]]                                      Final result               Gold Top - SST[[id number]]                                    Final result                 Please view results for these tests on the individual orders.   SODIUM, URINE, RANDOM   OSMOLALITY, URINE   CBC AND DIFFERENTIAL    Narrative:     The following orders were created for panel order CBC & Differential.  Procedure                               Abnormality         Status                     ---------                               -----------         ------                     Scan Slide[[id number]]                    Normal              Final result               CBC Auto Differential[[id number]]         Abnormal            Final result                 Please view results for these tests on the individual orders.   LIGHT BLUE TOP   GREEN TOP   LAVENDER TOP   GOLD TOP - SST       Xr Abdomen 2 View With Chest 1 View    Result Date: 4/2/2018  Narrative: EXAMINATION:  Acute abdominal series             VIEWS:  Chest:  1 ; Abdo:  2 DATE/TIME:     4/2/2018 2:59 PM CDT   INDICATION: vomiting , chest pain , COMPARISON EXAMINATION:    none                          FINDINGS:                         Chest:     heart size: within normal limits     mediastinal contour: within normal limits     lungs: no evidence of focal air space disease, grossly negative     pleura:  no pleural fluid     osseous: limited, grossly negative for age.     misc: Abdomen:     bowel gas pattern: nonobstructive     free air:  none visualized     calculi:   none visualized     osseous:  limited, grossly negative for age.     misc:       Impression: CONCLUSION:    1. Negative examination.                            If pain or symptoms persist beyond reasonable expectations, a contrast enhanced CT examination is suggested, as is deemed clinical appropriate.               Electronically signed by:  JOSE Cunha MD  4/2/2018 3:00 PM CDT Workstation: [id number]          Final diagnoses:   Hyponatremia   Hypokalemia   General weakness   Chest pain, unspecified type            Lee Pimentel MD  04/02/18 9427

## 2018-04-03 ENCOUNTER — APPOINTMENT (OUTPATIENT)
Dept: GENERAL RADIOLOGY | Facility: HOSPITAL | Age: 63
End: 2018-04-03

## 2018-04-03 LAB
ALBUMIN SERPL-MCNC: 3.5 G/DL (ref 3.4–4.8)
ALBUMIN/GLOB SERPL: 1.5 G/DL (ref 1.1–1.8)
ALP SERPL-CCNC: 54 U/L (ref 38–126)
ALT SERPL W P-5'-P-CCNC: 136 U/L (ref 21–72)
ANION GAP SERPL CALCULATED.3IONS-SCNC: 13 MMOL/L (ref 5–15)
AST SERPL-CCNC: 85 U/L (ref 17–59)
BASOPHILS # BLD AUTO: 0.05 10*3/MM3 (ref 0–0.2)
BASOPHILS NFR BLD AUTO: 0.8 % (ref 0–2)
BILIRUB SERPL-MCNC: 1 MG/DL (ref 0.2–1.3)
BUN BLD-MCNC: 8 MG/DL (ref 7–21)
BUN/CREAT SERPL: 14 (ref 7–25)
CALCIUM SPEC-SCNC: 8.4 MG/DL (ref 8.4–10.2)
CHLORIDE SERPL-SCNC: 84 MMOL/L (ref 95–110)
CO2 SERPL-SCNC: 23 MMOL/L (ref 22–31)
CORTIS SERPL-MCNC: 8.7 MCG/DL (ref 4.46–22.7)
CREAT BLD-MCNC: 0.57 MG/DL (ref 0.7–1.3)
DEPRECATED RDW RBC AUTO: 38.7 FL (ref 35.1–43.9)
EOSINOPHIL # BLD AUTO: 0.04 10*3/MM3 (ref 0–0.7)
EOSINOPHIL NFR BLD AUTO: 0.7 % (ref 0–7)
ERYTHROCYTE [DISTWIDTH] IN BLOOD BY AUTOMATED COUNT: 11.4 % (ref 11.5–14.5)
GFR SERPL CREATININE-BSD FRML MDRD: 145 ML/MIN/1.73 (ref 49–113)
GFR SERPL CREATININE-BSD FRML MDRD: 176 ML/MIN/1.73 (ref 49–113)
GLOBULIN UR ELPH-MCNC: 2.4 GM/DL (ref 2.3–3.5)
GLUCOSE BLD-MCNC: 73 MG/DL (ref 60–100)
HCT VFR BLD AUTO: 34.4 % (ref 39–49)
HGB BLD-MCNC: 13.3 G/DL (ref 13.7–17.3)
IMM GRANULOCYTES # BLD: 0.04 10*3/MM3 (ref 0–0.02)
IMM GRANULOCYTES NFR BLD: 0.7 % (ref 0–0.5)
LYMPHOCYTES # BLD AUTO: 1.36 10*3/MM3 (ref 0.6–4.2)
LYMPHOCYTES NFR BLD AUTO: 22.7 % (ref 10–50)
MAGNESIUM SERPL-MCNC: 2 MG/DL (ref 1.6–2.3)
MCH RBC QN AUTO: 35.8 PG (ref 26.5–34)
MCHC RBC AUTO-ENTMCNC: 38.7 G/DL (ref 31.5–36.3)
MCV RBC AUTO: 92.5 FL (ref 80–98)
MONOCYTES # BLD AUTO: 0.76 10*3/MM3 (ref 0–0.9)
MONOCYTES NFR BLD AUTO: 12.7 % (ref 0–12)
NEUTROPHILS # BLD AUTO: 3.75 10*3/MM3 (ref 2–8.6)
NEUTROPHILS NFR BLD AUTO: 62.4 % (ref 37–80)
PHOSPHATE SERPL-MCNC: 2.6 MG/DL (ref 2.4–4.4)
PLATELET # BLD AUTO: 195 10*3/MM3 (ref 150–450)
PMV BLD AUTO: 10 FL (ref 8–12)
POTASSIUM BLD-SCNC: 3.4 MMOL/L (ref 3.5–5.1)
PROT SERPL-MCNC: 5.9 G/DL (ref 6.3–8.6)
RBC # BLD AUTO: 3.72 10*6/MM3 (ref 4.37–5.74)
SODIUM BLD-SCNC: 119 MMOL/L (ref 137–145)
SODIUM BLD-SCNC: 119 MMOL/L (ref 137–145)
SODIUM BLD-SCNC: 120 MMOL/L (ref 137–145)
SODIUM BLD-SCNC: 122 MMOL/L (ref 137–145)
TSH SERPL DL<=0.05 MIU/L-ACNC: 0.7 MIU/ML (ref 0.46–4.68)
WBC NRBC COR # BLD: 6 10*3/MM3 (ref 3.2–9.8)

## 2018-04-03 PROCEDURE — 84100 ASSAY OF PHOSPHORUS: CPT | Performed by: INTERNAL MEDICINE

## 2018-04-03 PROCEDURE — 71045 X-RAY EXAM CHEST 1 VIEW: CPT

## 2018-04-03 PROCEDURE — 84295 ASSAY OF SERUM SODIUM: CPT | Performed by: INTERNAL MEDICINE

## 2018-04-03 PROCEDURE — 80053 COMPREHEN METABOLIC PANEL: CPT | Performed by: STUDENT IN AN ORGANIZED HEALTH CARE EDUCATION/TRAINING PROGRAM

## 2018-04-03 PROCEDURE — 25010000003 POTASSIUM CHLORIDE 10 MEQ/100ML SOLUTION: Performed by: INTERNAL MEDICINE

## 2018-04-03 PROCEDURE — 85025 COMPLETE CBC W/AUTO DIFF WBC: CPT | Performed by: FAMILY MEDICINE

## 2018-04-03 PROCEDURE — 99232 SBSQ HOSP IP/OBS MODERATE 35: CPT | Performed by: STUDENT IN AN ORGANIZED HEALTH CARE EDUCATION/TRAINING PROGRAM

## 2018-04-03 PROCEDURE — 83735 ASSAY OF MAGNESIUM: CPT | Performed by: INTERNAL MEDICINE

## 2018-04-03 PROCEDURE — 84443 ASSAY THYROID STIM HORMONE: CPT | Performed by: INTERNAL MEDICINE

## 2018-04-03 RX ORDER — LISINOPRIL 10 MG/1
10 TABLET ORAL
Status: DISCONTINUED | OUTPATIENT
Start: 2018-04-03 | End: 2018-04-05

## 2018-04-03 RX ORDER — POTASSIUM CHLORIDE 750 MG/1
40 CAPSULE, EXTENDED RELEASE ORAL ONCE
Status: COMPLETED | OUTPATIENT
Start: 2018-04-03 | End: 2018-04-03

## 2018-04-03 RX ORDER — POTASSIUM CHLORIDE 7.45 MG/ML
10 INJECTION INTRAVENOUS
Status: COMPLETED | OUTPATIENT
Start: 2018-04-03 | End: 2018-04-03

## 2018-04-03 RX ADMIN — LISINOPRIL 10 MG: 10 TABLET ORAL at 08:59

## 2018-04-03 RX ADMIN — POTASSIUM CHLORIDE 10 MEQ: 7.46 INJECTION, SOLUTION INTRAVENOUS at 04:17

## 2018-04-03 RX ADMIN — TAMSULOSIN HYDROCHLORIDE 0.4 MG: 0.4 CAPSULE ORAL at 08:59

## 2018-04-03 RX ADMIN — POTASSIUM CHLORIDE 10 MEQ: 7.46 INJECTION, SOLUTION INTRAVENOUS at 02:09

## 2018-04-03 RX ADMIN — POTASSIUM CHLORIDE 10 MEQ: 7.46 INJECTION, SOLUTION INTRAVENOUS at 03:02

## 2018-04-03 RX ADMIN — ACETAMINOPHEN 650 MG: 325 TABLET ORAL at 03:13

## 2018-04-03 RX ADMIN — POTASSIUM CHLORIDE 10 MEQ: 7.46 INJECTION, SOLUTION INTRAVENOUS at 06:31

## 2018-04-03 RX ADMIN — SODIUM CHLORIDE 70 ML/HR: 9 INJECTION, SOLUTION INTRAVENOUS at 14:13

## 2018-04-03 RX ADMIN — PANTOPRAZOLE SODIUM 40 MG: 40 TABLET, DELAYED RELEASE ORAL at 06:31

## 2018-04-03 RX ADMIN — POTASSIUM CHLORIDE 10 MEQ: 7.46 INJECTION, SOLUTION INTRAVENOUS at 05:12

## 2018-04-03 RX ADMIN — PANTOPRAZOLE SODIUM 40 MG: 40 TABLET, DELAYED RELEASE ORAL at 18:29

## 2018-04-03 RX ADMIN — POTASSIUM CHLORIDE 40 MEQ: 750 CAPSULE, EXTENDED RELEASE ORAL at 11:31

## 2018-04-03 NOTE — PLAN OF CARE
Problem: Patient Care Overview  Goal: Plan of Care Review   04/03/18 1600   Coping/Psychosocial   Plan of Care Reviewed With caregiver;patient   Plan of Care Review   Progress no change   OTHER   Outcome Summary Pt admitted with hx of poor po along wtih N & V with a 15# wt loss in chrissy laste 4 months. RD will monitor diet advancement and tolerance

## 2018-04-03 NOTE — CONSULTS
Adult Nutrition  Assessment    Patient Name:  Barron Ackerman  YOB: 1955  MRN: [id number]  Admit Date:  4/2/2018    Assessment Date:  4/3/2018    Comments: Pt admitted with Hypokalemia and severe Hyponatremia. Inadequate oral intake with persistent N & V with resulting wt loss of ~15#.  Pt currently on clear liquids but no Gi distress  Will monitor diet advancement and tolerance.           Adult Nutrition Assessment     Row Name 04/03/18 1557       PO Evaluation    Number of Days PO Intake Evaluated Insufficient Data    Row Name 04/03/18 155       Calculation Measurements    Weight Used For Calculations 78.5 kg (173 lb)       Estimated/Assessed Needs    Additional Documentation Calorie Requirements (Group);KCAL/KG (Group);Protein Requirements (Group);Fluid Requirements (Group);[place]-St. Jeor Equation (Group)       Calorie Requirements    Estimated Calorie Requirement Comment 1961       KCAL/KG    14 Kcal/Kg (kcal) 1098.61    15 Kcal/Kg (kcal) 1177.08    18 Kcal/Kg (kcal) 1412.5    20 Kcal/Kg (kcal) 1569.44    25 Kcal/Kg (kcal) 1961.8    30 Kcal/Kg (kcal) 2354.16    35 Kcal/Kg (kcal) 2746.52    40 Kcal/Kg (kcal) 3138.88    45 Kcal/Kg (kcal) 3531.24    50 Kcal/Kg (kcal) 3923.6       [place]-St. Jeor Equation    RMR (Lex[place]-St. Jeor Equation) 1590.97       Protein Requirements    Est Protein Requirement Amount (gms/kg) 1.0 gm protein    Estimated Protein Requirements (gms/day) 78.47       Fluid Requirements    Estimated Fluid Requirements (mL/day) 1950    Estimated Fluid Requirement Method RDA Method    RDA Method (mL) 1950    Pelon-Angel Method (over 20 kg) 3069.44       Nutrition Prescription PO    Current PO Diet Clear Liquid    Fluid Consistency Thin    Row Name 04/03/18 9628       Labs/Procedures/Meds    Lab Results Reviewed reviewed, pertinent    Lab Results Comments Na+ 120; K+3.4    Row Name 04/03/18 3172       Reason for Assessment    Reason For Assessment identified at risk by screening  "criteria    Diagnosis metabolic state    Identified At Risk by Screening Criteria MST SCORE 2+       Nutrition/Diet History    Typical Food/Fluid Intake Pt said that he had the flu in the last 4 months and lost wt and then started feeling bad again and his appetite never really came back.  He had persistent N & V. He was weak.  He has lost ~15# in the last 4 months    Row Name 04/03/18 1408       Anthropometrics    Height 177.8 cm (70\")    Weight 78.5 kg (173 lb)       Ideal Body Weight (IBW)    Ideal Body Weight (IBW) (kg) 76.48    % Ideal Body Weight 102.6       Body Mass Index (BMI)    BMI (kg/m2) 24.87       IBW Adjustment, Para/Tetraplegia    5% Adjustment, Para (IBW) 72.66    10% Adjustment, Para (IBW) 68.83    10% Adjustment, Tetra (IBW) 68.83    15% Adjustment, Tetra (IBW) 65.01          Electronically signed by:  Emilee Mathur RD  04/03/18 4:01 PM  "

## 2018-04-03 NOTE — PROGRESS NOTES
"[facility]  [address] [place] [address]. [address]  T - [phone]  F - [phone]     Progress Note          PATIENT  DEMOGRAPHICS   PATIENT NAME: Barron Ackerman                      PHYSICIAN: Gilma Britt MD  : 1955  MRN: [id number]   LOS: 1 day    Patient Care Team:  [facility] Celestin as PCP - General  Subjective   SUBJECTIVE   Feels well no soa       Objective   OBJECTIVE   Vital Signs  Temp:  [98.5 °F (36.9 °C)-98.8 °F (37.1 °C)] 98.7 °F (37.1 °C)  Heart Rate:  [66-87] 66  Resp:  [16-18] 18  BP: (101-136)/(61-85) 133/77    Flowsheet Rows    Flowsheet Row First Filed Value   Admission Height 177.8 cm (70\") Documented at 2018 1423   Admission Weight 75.8 kg (167 lb) Documented at 2018 1423           I/O last 3 completed shifts:  In: 1668 [P.O.:530; I.V.:738; IV Piggyback:400]  Out: 550 [Urine:550]    PHYSICAL EXAM    Physical Exam   Constitutional: He is oriented to person, place, and time. He appears well-developed.   HENT:   Head: Normocephalic.   Eyes: Pupils are equal, round, and reactive to light.   Cardiovascular: Normal rate, regular rhythm and normal heart sounds.    Pulmonary/Chest: Effort normal and breath sounds normal.   Abdominal: Soft. Bowel sounds are normal.   Neurological: He is alert and oriented to person, place, and time.       RESULTS   Results Review:      Results from last 7 days  Lab Units 18  2123 18  1804 18  1431   SODIUM mmol/L 117* 115* 114*   POTASSIUM mmol/L 3.1*  --  2.3*   CHLORIDE mmol/L  --   --  72*   CO2 mmol/L  --   --  29.0   BUN mg/dL  --   --  8   CREATININE mg/dL  --   --  0.60*   CALCIUM mg/dL  --   --  8.8   BILIRUBIN mg/dL  --   --  1.4*   ALK PHOS U/L  --   --  65   ALT (SGPT) U/L  --   --  152*   AST (SGOT) U/L  --   --  97*   GLUCOSE mg/dL  --   --  102*       Estimated Creatinine Clearance: 131.6 mL/min (by C-G formula based on SCr of 0.6 mg/dL (L)).            Results from last 7 days  Lab " Units 04/02/18  1804   URIC ACID mg/dL 3.2         Results from last 7 days  Lab Units 04/02/18  1431   WBC 10*3/mm3 6.18   HEMOGLOBIN g/dL 14.8   PLATELETS 10*3/mm3 197         Results from last 7 days  Lab Units 04/02/18  1431   INR  0.89         Imaging Results (last 24 hours)     Procedure Component Value Units Date/Time    XR Chest 1 View [[id number]] Collected:  04/03/18 0424     Updated:  04/03/18 0812    Narrative:         PROCEDURE: Single chest view AP    REASON FOR EXAM:hyponatremia smoker wt loss rule out mass, E87.1  Hypo-osmolality and hyponatremia E87.6 Hypokalemia R53.1 Weakness  R07.9 Chest pain, unspecified    FINDINGS: Comparison exam dated April 2, 2018. Cardiac and  pulmonary vasculature are normal. Lungs are clear. Pleural spaces  are normal. No acute osseous abnormality.      Impression:       Negative single view chest    Electronically signed by:  Uriah Howe MD  4/3/2018 8:11 AM CDT  Workstation: [id number]    XR Abdomen 2 View With Chest 1 View [[id number]] Collected:  04/02/18 1436     Updated:  04/02/18 1501    Narrative:         EXAMINATION:  Acute abdominal series              VIEWS:  Chest:  1 ; Abdo:  2   DATE/TIME:     4/2/2018 2:59 PM CDT      INDICATION: vomiting , chest pain ,  COMPARISON EXAMINATION:    none                              FINDINGS:                             Chest:      heart size: within normal limits      mediastinal contour: within normal limits       lungs: no evidence of focal air space disease, grossly  negative      pleura:  no pleural fluid      osseous: limited, grossly negative for age.      misc:    Abdomen:      bowel gas pattern: nonobstructive      free air:  none visualized      calculi:   none visualized      osseous:  limited, grossly negative for age.      misc:        Impression:       CONCLUSION:        1. Negative examination.                                If pain or symptoms persist beyond reasonable expectations, a  contrast enhanced CT  examination is suggested, as is deemed  clinical appropriate.                       Electronically signed by:  JOSE Cunha MD  4/2/2018 3:00 PM  CDT Workstation: [id number]           MEDICATIONS      lisinopril 10 mg Oral Q24H   pantoprazole 40 mg Oral BID AC   tamsulosin 0.4 mg Oral Q24H       sodium chloride 70 mL/hr Last Rate: 70 mL/hr (04/02/18 2116)       Assessment/Plan   ASSESSMENT / PLAN    Principal Problem:    Hyponatremia  Active Problems:    Benign prostatic hyperplasia with lower urinary tract symptoms    Gastroesophageal reflux disease    Hypertension    Hypokalemia    1.hyponatremia.  Patient urine osmolality is not extremely high and appears lower than serum osmolality.  I believe he is hypovolemic in the setting of recent illness vomiting diarrhea and also on superimposed HCTZ in the background lead to more hypovolemic picture.  He also has weight loss and prior history of smoking.     cxr is negative. Cortisol is normal. tsh is pending. Keep normal saline at 70cc/hr and will review todays lab. Check lab q 4-6 hr today. Will correct the sodium 8-10 MEQ point per day.     2.history of hypertension keep  lisinopril and hctz is on hold    3. Hypokalemia - replaced last night. Awaiting am lab.                    This document has been electronically signed by Gilma Britt MD on April 3, 2018 8:20 AM

## 2018-04-03 NOTE — MEDICAL STUDENT
FAMILY MEDICINE DAILY PROGRESS NOTE    NAME: Barron Ackerman  : 1955  MRN: [id number]      LOS: 1 day     PROVIDER OF SERVICE: Ramos Dewitt    Chief Complaint: Hyponatremia    Subjective:     Interval History:  History taken from: patient chart RN  Doing well overnight. No acute events. Complains of bilateral low back pain, which he reports is chronic. No weakness, dizziness, confusion. Nausea, vomiting resolved.     Review of Systems:   Review of Systems   Constitutional: Positive for appetite change (drinks 32 oz water per day) and unexpected weight change (weight loss). Negative for activity change and fever.   HENT: Positive for rhinorrhea. Negative for facial swelling.    Respiratory: Negative for chest tightness and shortness of breath.    Cardiovascular: Negative for chest pain, palpitations and leg swelling.   Gastrointestinal: Positive for nausea (now improved) and vomiting (now improved). Negative for abdominal distention and abdominal pain.   Endocrine: Negative for polydipsia and polyphagia.   Genitourinary: Negative for difficulty urinating and frequency.   Musculoskeletal: Negative for arthralgias and myalgias.   Skin: Positive for color change (pallor reported by wife before admission).   Neurological: Positive for dizziness. Negative for headaches.   Psychiatric/Behavioral: Negative for agitation and confusion.   All other systems reviewed and are negative.      Objective:     Vital Signs  Temp:  [98.5 °F (36.9 °C)-98.8 °F (37.1 °C)] 98.7 °F (37.1 °C)  Heart Rate:  [66-87] 66  Resp:  [16-18] 18  BP: (101-136)/(61-85) 133/77  Body mass index is 23.06 kg/m².    Physical Exam  Physical Exam   Constitutional: He is oriented to person, place, and time. He appears well-developed and well-nourished.   HENT:   Head: Normocephalic and atraumatic.   Eyes: EOM are normal. Pupils are equal, round, and reactive to light.   Neck: Normal range of motion. Neck supple.   Cardiovascular: Normal rate and  regular rhythm.    Murmur (grade 2/6 systolic ejection murmur at RUSB) heard.  Pulmonary/Chest: Effort normal and breath sounds normal.   Abdominal: Soft. Bowel sounds are normal.   Musculoskeletal: Normal range of motion. He exhibits no edema.   Neurological: He is alert and oriented to person, place, and time.   Skin: Skin is warm and dry.   Nursing note and vitals reviewed.      Medication Review: Done  Holding HCTZ. Continue Lisinopril.  NS at 70 cc/hr with max rate of Na+ change at 8-10 mEq/L per day      Diagnostic Data    Lab Results (last 24 hours)     Procedure Component Value Units Date/Time    Cortisol [[id number]]  (Normal) Collected:  04/02/18 1804    Specimen:  Blood Updated:  04/03/18 0254     Cortisol 8.70 mcg/dL     Sodium [[id number]]  (Abnormal) Collected:  04/02/18 2123    Specimen:  Blood Updated:  04/02/18 2143     Sodium 117 (C) mmol/L     Potassium [[id number]]  (Abnormal) Collected:  04/02/18 2123    Specimen:  Blood Updated:  04/02/18 2142     Potassium 3.1 (L) mmol/L     Sodium [[id number]]  (Abnormal) Collected:  04/02/18 1804    Specimen:  Blood Updated:  04/02/18 1835     Sodium 115 (C) mmol/L     Uric Acid [[id number]]  (Normal) Collected:  04/02/18 1804    Specimen:  Blood Updated:  04/02/18 1830     Uric Acid 3.2 mg/dL     Osmolality, Urine - Urine, Clean Catch [[id number]]  (Normal) Collected:  04/02/18 1639    Specimen:  Urine from Urine, Clean Catch Updated:  04/02/18 1704     Osmolality, Urine 223 mOsm/kg     Sodium, Urine, Random - Urine, Clean Catch [[id number]]  (Normal) Collected:  04/02/18 1639    Specimen:  Urine from Urine, Clean Catch Updated:  04/02/18 1656     Sodium, Urine 44 mmol/L     Urinalysis With / Culture If Indicated - Urine, Clean Catch [[id number]]  (Abnormal) Collected:  04/02/18 1639    Specimen:  Urine from Urine, Clean Catch Updated:  04/02/18 1643     Color, UA Yellow     Appearance, UA Clear     pH, UA 7.5     Specific Gravity, UA 1.007     Glucose,  mg/dL  (Trace) (A)     Ketones, UA Trace (A)     Bilirubin, UA Negative     Blood, UA Negative     Protein, UA Negative     Leuk Esterase, UA Negative     Nitrite, UA Negative     Urobilinogen, UA 1.0 E.U./dL    Narrative:       Urine microscopic not indicated.    CBC & Differential [[id number]] Collected:  04/02/18 1431    Specimen:  Blood Updated:  04/02/18 1608    Narrative:       The following orders were created for panel order CBC & Differential.  Procedure                               Abnormality         Status                     ---------                               -----------         ------                     Scan Slide[[id number]]                    Normal              Final result               CBC Auto Differential[[id number]]         Abnormal            Final result                 Please view results for these tests on the individual orders.    Scan Slide [[id number]]  (Normal) Collected:  04/02/18 1431    Specimen:  Blood Updated:  04/02/18 1608     RBC Morphology Normal     WBC Morphology Normal     Platelet Morphology Normal    [facility] Draw [[id number]] Collected:  04/02/18 1431    Specimen:  Blood Updated:  04/02/18 1546    Narrative:       The following orders were created for panel order [place] Draw.  Procedure                               Abnormality         Status                     ---------                               -----------         ------                     Light Blue Top[[id number]]                                    Final result               Green Top (Gel)[[id number]]                                   Final result               Lavender Top[[id number]]                                      Final result               Gold Top - SST[[id number]]                                    Final result                 Please view results for these tests on the individual orders.    Light Blue Top [[id number]] Collected:  04/02/18 1431    Specimen:  Blood Updated:  04/02/18 1546     Extra Tube hold for add-on      Comment: Auto resulted       Green Top (Gel) [[id number]] Collected:  04/02/18 1431    Specimen:  Blood Updated:  04/02/18 1546     Extra Tube Hold for add-ons.     Comment: Auto resulted.       Lavender Top [[id number]] Collected:  04/02/18 1431    Specimen:  Blood Updated:  04/02/18 1546     Extra Tube hold for add-on     Comment: Auto resulted       Gold Top - SST [[id number]] Collected:  04/02/18 1431    Specimen:  Blood Updated:  04/02/18 1546     Extra Tube Hold for add-ons.     Comment: Auto resulted.       BNP [[id number]]  (Normal) Collected:  04/02/18 1431    Specimen:  Blood Updated:  04/02/18 1519     proBNP 361.0 pg/mL     Troponin [[id number]]  (Normal) Collected:  04/02/18 1431    Specimen:  Blood Updated:  04/02/18 1519     Troponin I <0.012 ng/mL     Comprehensive Metabolic Panel [[id number]]  (Abnormal) Collected:  04/02/18 1431    Specimen:  Blood Updated:  04/02/18 1518     Glucose 102 (H) mg/dL      BUN 8 mg/dL      Creatinine 0.60 (L) mg/dL      Sodium 114 (C) mmol/L      Comment: RERAN TO CONFIRM RESULT        Potassium 2.3 (C) mmol/L      Comment: RERAN TO CONFIRM RESULT        Chloride 72 (L) mmol/L      CO2 29.0 mmol/L      Calcium 8.8 mg/dL      Total Protein 6.8 g/dL      Albumin 4.20 g/dL      ALT (SGPT) 152 (H) U/L      AST (SGOT) 97 (H) U/L      Alkaline Phosphatase 65 U/L      Total Bilirubin 1.4 (H) mg/dL      eGFR Non African Amer 137 (H) mL/min/1.73      eGFR  African Amer 165 (H) mL/min/1.73      Globulin 2.6 gm/dL      A/G Ratio 1.6 g/dL      BUN/Creatinine Ratio 13.3     Anion Gap 13.0 mmol/L     Protime-INR [[id number]]  (Normal) Collected:  04/02/18 1431    Specimen:  Blood Updated:  04/02/18 1516     Protime 11.9 Seconds      INR 0.89    Narrative:       Therapeutic range for most indications is 2.0-3.0 INR,  or 2.5-3.5 for mechanical heart valves.    aPTT [[id number]]  (Normal) Collected:  04/02/18 1431    Specimen:  Blood Updated:  04/02/18 1516     PTT 25.2 seconds     Narrative:        The recommended Heparin therapeutic range is 68-97 seconds.    Lipase [[id number]]  (Normal) Collected:  04/02/18 1431    Specimen:  Blood Updated:  04/02/18 1509     Lipase 114 U/L     CBC Auto Differential [[id number]]  (Abnormal) Collected:  04/02/18 1431    Specimen:  Blood Updated:  04/02/18 1458     WBC 6.18 10*3/mm3      RBC 4.12 (L) 10*6/mm3      Hemoglobin 14.8 g/dL      Hematocrit 37.4 (L) %      MCV 90.8 fL      MCH 35.9 (H) pg      MCHC 39.6 (H) g/dL      RDW 11.4 (L) %      RDW-SD 37.6 fl      MPV 9.8 fL      Platelets 197 10*3/mm3      Neutrophil % 67.0 %      Lymphocyte % 19.4 %      Monocyte % 12.3 (H) %      Eosinophil % 0.5 %      Basophil % 0.3 %      Immature Grans % 0.5 %      Neutrophils, Absolute 4.14 10*3/mm3      Lymphocytes, Absolute 1.20 10*3/mm3      Monocytes, Absolute 0.76 10*3/mm3      Eosinophils, Absolute 0.03 10*3/mm3      Basophils, Absolute 0.02 10*3/mm3      Immature Grans, Absolute 0.03 (H) 10*3/mm3             I reviewed the patient's new clinical results.    Assessment/Plan:     Active Hospital Problems (** Indicates Principal Problem)    Diagnosis Date Noted   • **Hyponatremia [E87.1] 04/02/2018     Na 114 today in the ER. 2 week hx of vomiting with no food intake.  -Nephrology consulted. Will correct the sodium 8-10 MG daily point per day.  -ER started 200cc bolus hypertonic saline.  -Urine Na, Osmolality, and creatinine pending.  -Uric acid and TSH and 8 AM cortisol.  -Rechecking sodium levels this evening, nephrology will most likely hold hypertonic saline and start patient on normal saline.     • Hypokalemia [E87.6] 04/02/2018     Potassium 2.3 in the ER on admission.  - EKG unremarkable.  - 40meq PO and 10 IV given in the ER.  - Give additional 80meq tonight PO.  -ICU on Tele.  -Nephrology consulted.     • Hypertension [I10] 04/02/2018     -Stable  -Continue home med of Prinzide.     • Gastroesophageal reflux disease [K21.9] 04/02/2018     Stable  -Continue home medication  PPI.     • Benign prostatic hyperplasia with lower urinary tract symptoms [N40.1] 04/02/2018     -Continue Flomax from home.        Resolved Hospital Problems    Diagnosis Date Noted Date Resolved   No resolved problems to display.     Would consider ordering low-dose CT scan of lung to evaluate for possible lung neoplasm due to h/o cigarette smoking and indication for routine screening.       DVT prophylaxis: SCDs/TEDs  Code status is Full Code    04/03/18 at 8:39 AM by Ramos Dewitt

## 2018-04-03 NOTE — H&P
Hyponatremia    Date of Admission: 4/2/2018  Subjective:     Barron Ackerman is a 62 y.o. male who presents for N/V and generalized weakness. Patient states symptoms started about 2 weeks ago. He states feeling sick to his stomach and would have several episodes of NBNB vomiting. He states that eating solid foods made him vomit. He was able to hold down some chicken broth and grits without issue. He states that due to all of this, felt weak overall. No sick contacts reported. No fevers/chills, no diarrhea reported. No abdominal pain reported. Patient states that due to not eating much, he has lost over 10 pounds since symptoms started. He states that he has had intermittent episodes where he feels dizzy. He states that one instance, he felt lightheaded, and fell backwards and hit a door, he did not faint or lose consciousness. No reports of seizure like episodes or any syncopal episodes.      The following portions of the patient's history were reviewed and updated as appropriate: allergies, current medications, past family history, past medical history, past social history, past surgical history and problem list.    Concurrent Medical Hx:  Past Medical History:   Diagnosis Date   • Hypertension        Past Surgical Hx:  Past Surgical History:   Procedure Laterality Date   • HERNIA REPAIR       Family Hx:  History reviewed. No pertinent family history.   Social History:  Social History     Social History   • Marital status:      Spouse name: N/A   • Number of children: N/A   • Years of education: N/A     Occupational History   • Not on file.     Social History Main Topics   • Smoking status: Current Some Day Smoker   • Smokeless tobacco: Never Used   • Alcohol use Yes      Comment: occassional   • Drug use: No   • Sexual activity: Not on file     Other Topics Concern   • Not on file     Social History Narrative   • No narrative on file       Home Medications:  No current facility-administered medications on  "file prior to encounter.      No current outpatient prescriptions on file prior to encounter.       Allergies:  Review of patient's allergies indicates no known allergies.  I reviewed the patient's new clinical results.  I reviewed the patient's new imaging results and agree with the interpretation.  I reviewed the patient's other test results and agree with the interpretation  I personally viewed and interpreted the patient's EKG/Telemetry data  Review of Systems  Review of Systems   Constitutional: Positive for appetite change. Negative for chills and fever.   HENT: Negative for congestion, ear pain, rhinorrhea, sneezing and sore throat.    Respiratory: Negative for cough and shortness of breath.    Cardiovascular: Negative for chest pain, palpitations and leg swelling.   Gastrointestinal: Positive for nausea and vomiting. Negative for diarrhea.   Endocrine: Negative for polyphagia and polyuria.   Musculoskeletal: Negative for back pain and neck pain.   Skin: Negative for color change and rash.   Neurological: Negative for dizziness, syncope and weakness.   Psychiatric/Behavioral: Negative for agitation, confusion and dysphoric mood.       Objective:     /80   Pulse 85   Temp 98.5 °F (36.9 °C) (Oral)   Resp 18   Ht 177.8 cm (70\")   Wt 71.9 kg (158 lb 8.2 oz)   SpO2 100%   BMI 22.74 kg/m²   Physical Exam   Constitutional: He is oriented to person, place, and time. He appears well-developed and well-nourished.   Cardiovascular: Normal rate, regular rhythm and normal heart sounds.  Exam reveals no gallop and no friction rub.    No murmur heard.  Pulmonary/Chest: Effort normal and breath sounds normal. No respiratory distress. He has no wheezes. He has no rales.   Abdominal: Soft. Bowel sounds are normal. There is no tenderness.   Musculoskeletal: Normal range of motion. He exhibits no edema.   Neurological: He is alert and oriented to person, place, and time.   Skin: Skin is warm and dry.   Psychiatric: " He has a normal mood and affect. His behavior is normal.   Vitals reviewed.       I reviewed the patient's new clinical results.  I reviewed the patient's new imaging results.  I reviewed the patient's other test results.  I personally viewed the patient's EKG/Telemetry data  Assessment/Plan:     Active Hospital Problems (** Indicates Principal Problem)    Diagnosis Date Noted   • **Hyponatremia [E87.1] 04/02/2018     Na 114 today in the ER. 2 week hx of vomiting with no food intake.  -Nephrology consulted. Will correct the sodium 8-10 MG daily point per day.  -ER started 200cc bolus hypertonic saline.  -Urine Na, Osmolality, and creatinine pending.  -Uric acid and TSH and 8 AM cortisol.  -Rechecking sodium levels this evening, nephrology will most likely hold hypertonic saline and start patient on normal saline.     • Hypokalemia [E87.6] 04/02/2018     Potassium 2.3 in the ER on admission.  - EKG unremarkable.  - 40meq PO and 10 IV given in the ER.  - Give additional 80meq tonight PO.  -ICU on Tele.  -Nephrology consulted.     • Hypertension [I10] 04/02/2018     -Stable  -Continue home med of Prinzide.     • Gastroesophageal reflux disease [K21.9] 04/02/2018     Stable  -Continue home medication PPI.     • Benign prostatic hyperplasia with lower urinary tract symptoms [N40.1] 04/02/2018     -Continue Flomax from home.        Resolved Hospital Problems    Diagnosis Date Noted Date Resolved   No resolved problems to display.       I have seen and examined the patient.  I have reviewed the notes, assessments, and/or procedures performed by Dr. Patrick, I concur with her/his documentation and assessment and plan for Barron Ackerman.      This document has been electronically signed by Philippe Hickey MD on April 2, 2018 7:15 PM

## 2018-04-03 NOTE — PROGRESS NOTES
FAMILY MEDICINE DAILY PROGRESS NOTE    NAME: Barron Ackerman  : 1955  MRN: [id number]      LOS: 1 day     PROVIDER OF SERVICE: Janusz Patrick MD    Chief Complaint: Hyponatremia    Subjective:     Interval History:  History taken from: patient chart RN  Patient Complaints: fatigue    No history of events last night.  Patient states she is feeling better compared to yesterday.  When asked to elaborate patient explains he is feeling less dizzy, lightheaded, and fatigued.  No nausea, vomiting, diarrhea.  He has no other concerns/complaints that he would like to discuss smoking.    Review of Systems:   Review of Systems   Constitutional: Positive for fatigue. Negative for activity change, appetite change, chills, diaphoresis and fever.   HENT: Positive for congestion. Negative for ear pain, rhinorrhea, sneezing and sore throat.    Eyes: Negative for pain, redness, itching and visual disturbance.   Respiratory: Positive for cough. Negative for choking, chest tightness, shortness of breath, wheezing and stridor.    Cardiovascular: Negative for chest pain, palpitations and leg swelling.   Gastrointestinal: Negative for abdominal distention, abdominal pain, blood in stool, constipation, diarrhea, nausea, rectal pain and vomiting.   Genitourinary: Negative for difficulty urinating, dysuria, flank pain and frequency.   Skin: Negative for color change and rash.   Neurological: Positive for weakness. Negative for dizziness, seizures, syncope, light-headedness, numbness and headaches.   Psychiatric/Behavioral: Negative for agitation, confusion, decreased concentration and sleep disturbance. The patient is not nervous/anxious.    All other systems reviewed and are negative.      Objective:     Vital Signs  Temp:  [98.5 °F (36.9 °C)-98.8 °F (37.1 °C)] 98.7 °F (37.1 °C)  Heart Rate:  [66-87] 66  Resp:  [16-18] 18  BP: (101-136)/(61-85) 133/77  Body mass index is 23.06 kg/m².    Physical Exam  Physical Exam    Constitutional: He is oriented to person, place, and time. He appears well-developed and well-nourished.  Non-toxic appearance. He does not have a sickly appearance. He does not appear ill. No distress.   HENT:   Head: Normocephalic and atraumatic.   Right Ear: External ear normal. No lacerations. No swelling or tenderness.   Left Ear: External ear normal. No lacerations. No swelling or tenderness.   Nose: Nose normal.   Mouth/Throat: Uvula is midline, oropharynx is clear and moist and mucous membranes are normal.   Eyes: Conjunctivae, EOM and lids are normal. Pupils are equal, round, and reactive to light. No scleral icterus.   Neck: No tracheal deviation present. No thyromegaly present.   Cardiovascular: Normal rate, regular rhythm, normal heart sounds and intact distal pulses.  Exam reveals no gallop, no distant heart sounds and no friction rub.    No murmur heard.  Pulses:       Carotid pulses are 2+ on the right side, and 2+ on the left side.       Radial pulses are 2+ on the right side, and 2+ on the left side.        Dorsalis pedis pulses are 2+ on the right side, and 2+ on the left side.        Posterior tibial pulses are 2+ on the right side, and 2+ on the left side.   Pulmonary/Chest: Effort normal. No accessory muscle usage or stridor. No respiratory distress. He has no decreased breath sounds. He has no wheezes. He has rhonchi in the left lower field. He has no rales. He exhibits no tenderness.   Abdominal: Soft. Bowel sounds are normal. He exhibits no shifting dullness, no distension and no ascites. There is no tenderness. There is no rigidity, no rebound and no guarding.   Musculoskeletal:        Right lower leg: He exhibits no swelling.        Left lower leg: He exhibits no swelling.        Right foot: There is no swelling.        Left foot: There is no swelling.     Vascular Status -  His right foot exhibits normal foot vasculature  and no edema. His left foot exhibits normal foot vasculature  and  no edema.  Lymphadenopathy:     He has no cervical adenopathy.   Neurological: He is alert and oriented to person, place, and time. He has normal strength. No cranial nerve deficit or sensory deficit. He exhibits normal muscle tone. GCS eye subscore is 4. GCS verbal subscore is 5. GCS motor subscore is 6.   Skin: Skin is warm and dry. No rash noted. He is not diaphoretic. No erythema. No pallor.   Psychiatric: He has a normal mood and affect. His speech is normal.   Nursing note and vitals reviewed.      Medication Review    Current Facility-Administered Medications:   •  acetaminophen (TYLENOL) tablet 650 mg, 650 mg, Oral, Q4H PRN, Jnausz Patrick MD, 650 mg at 04/03/18 0313  •  hydrALAZINE (APRESOLINE) injection 20 mg, 20 mg, Intravenous, Q4H PRN, Janusz Patrick MD  •  lisinopril (PRINIVIL,ZESTRIL) tablet 10 mg, 10 mg, Oral, Q24H, Gilma Britt MD  •  ondansetron (ZOFRAN) tablet 4 mg, 4 mg, Oral, Q6H PRN, Janusz Patrick MD  •  pantoprazole (PROTONIX) EC tablet 40 mg, 40 mg, Oral, BID AC, Janusz Patrick MD, 40 mg at 04/03/18 0631  •  sodium chloride 0.9 % flush 1-10 mL, 1-10 mL, Intravenous, PRN, Janusz Patrick MD  •  sodium chloride 0.9 % infusion, 70 mL/hr, Intravenous, Continuous, Gilma Britt MD, Last Rate: 70 mL/hr at 04/02/18 2117, 70 mL/hr at 04/02/18 2117  •  tamsulosin (FLOMAX) 24 hr capsule 0.4 mg, 0.4 mg, Oral, Q24H, Janusz Patrick MD     Diagnostic Data    Lab Results (last 24 hours)     Procedure Component Value Units Date/Time    Cortisol [[id number]]  (Normal) Collected:  04/02/18 1804    Specimen:  Blood Updated:  04/03/18 0254     Cortisol 8.70 mcg/dL     Sodium [[id number]]  (Abnormal) Collected:  04/02/18 2123    Specimen:  Blood Updated:  04/02/18 2143     Sodium 117 (C) mmol/L     Potassium [[id number]]  (Abnormal) Collected:  04/02/18 2123    Specimen:  Blood Updated:  04/02/18 2142     Potassium 3.1 (L) mmol/L     Sodium [[id number]]  (Abnormal) Collected:  04/02/18 1804    Specimen:   Blood Updated:  04/02/18 1835     Sodium 115 (C) mmol/L     Uric Acid [[id number]]  (Normal) Collected:  04/02/18 1804    Specimen:  Blood Updated:  04/02/18 1830     Uric Acid 3.2 mg/dL     Osmolality, Urine - Urine, Clean Catch [[id number]]  (Normal) Collected:  04/02/18 1639    Specimen:  Urine from Urine, Clean Catch Updated:  04/02/18 1704     Osmolality, Urine 223 mOsm/kg     Sodium, Urine, Random - Urine, Clean Catch [[id number]]  (Normal) Collected:  04/02/18 1639    Specimen:  Urine from Urine, Clean Catch Updated:  04/02/18 1656     Sodium, Urine 44 mmol/L     Urinalysis With / Culture If Indicated - Urine, Clean Catch [[id number]]  (Abnormal) Collected:  04/02/18 1639    Specimen:  Urine from Urine, Clean Catch Updated:  04/02/18 1643     Color, UA Yellow     Appearance, UA Clear     pH, UA 7.5     Specific Gravity, UA 1.007     Glucose,  mg/dL (Trace) (A)     Ketones, UA Trace (A)     Bilirubin, UA Negative     Blood, UA Negative     Protein, UA Negative     Leuk Esterase, UA Negative     Nitrite, UA Negative     Urobilinogen, UA 1.0 E.U./dL    Narrative:       Urine microscopic not indicated.    CBC & Differential [[id number]] Collected:  04/02/18 1431    Specimen:  Blood Updated:  04/02/18 1608    Narrative:       The following orders were created for panel order CBC & Differential.  Procedure                               Abnormality         Status                     ---------                               -----------         ------                     Scan Slide[[id number]]                    Normal              Final result               CBC Auto Differential[[id number]]         Abnormal            Final result                 Please view results for these tests on the individual orders.    Scan Slide [[id number]]  (Normal) Collected:  04/02/18 1431    Specimen:  Blood Updated:  04/02/18 1608     RBC Morphology Normal     WBC Morphology Normal     Platelet Morphology Normal    [place] Draw [[id number]]  Collected:  04/02/18 1431    Specimen:  Blood Updated:  04/02/18 1546    Narrative:       The following orders were created for panel order [place] Draw.  Procedure                               Abnormality         Status                     ---------                               -----------         ------                     Light Blue Top[[id number]]                                    Final result               Green Top (Gel)[[id number]]                                   Final result               Lavender Top[[id number]]                                      Final result               Gold Top - SST[[id number]]                                    Final result                 Please view results for these tests on the individual orders.    Light Blue Top [[id number]] Collected:  04/02/18 1431    Specimen:  Blood Updated:  04/02/18 1546     Extra Tube hold for add-on     Comment: Auto resulted       Green Top (Gel) [[id number]] Collected:  04/02/18 1431    Specimen:  Blood Updated:  04/02/18 1546     Extra Tube Hold for add-ons.     Comment: Auto resulted.       Lavender Top [[id number]] Collected:  04/02/18 1431    Specimen:  Blood Updated:  04/02/18 1546     Extra Tube hold for add-on     Comment: Auto resulted       Gold Top - SST [[id number]] Collected:  04/02/18 1431    Specimen:  Blood Updated:  04/02/18 1546     Extra Tube Hold for add-ons.     Comment: Auto resulted.       BNP [[id number]]  (Normal) Collected:  04/02/18 1431    Specimen:  Blood Updated:  04/02/18 1519     proBNP 361.0 pg/mL     Troponin [[id number]]  (Normal) Collected:  04/02/18 1431    Specimen:  Blood Updated:  04/02/18 1519     Troponin I <0.012 ng/mL     Comprehensive Metabolic Panel [[id number]]  (Abnormal) Collected:  04/02/18 1431    Specimen:  Blood Updated:  04/02/18 1518     Glucose 102 (H) mg/dL      BUN 8 mg/dL      Creatinine 0.60 (L) mg/dL      Sodium 114 (C) mmol/L      Comment: RERAN TO CONFIRM RESULT        Potassium 2.3 (C) mmol/L       Comment: RERAN TO CONFIRM RESULT        Chloride 72 (L) mmol/L      CO2 29.0 mmol/L      Calcium 8.8 mg/dL      Total Protein 6.8 g/dL      Albumin 4.20 g/dL      ALT (SGPT) 152 (H) U/L      AST (SGOT) 97 (H) U/L      Alkaline Phosphatase 65 U/L      Total Bilirubin 1.4 (H) mg/dL      eGFR Non African Amer 137 (H) mL/min/1.73      eGFR  African Amer 165 (H) mL/min/1.73      Globulin 2.6 gm/dL      A/G Ratio 1.6 g/dL      BUN/Creatinine Ratio 13.3     Anion Gap 13.0 mmol/L     Protime-INR [[id number]]  (Normal) Collected:  04/02/18 1431    Specimen:  Blood Updated:  04/02/18 1516     Protime 11.9 Seconds      INR 0.89    Narrative:       Therapeutic range for most indications is 2.0-3.0 INR,  or 2.5-3.5 for mechanical heart valves.    aPTT [[id number]]  (Normal) Collected:  04/02/18 1431    Specimen:  Blood Updated:  04/02/18 1516     PTT 25.2 seconds     Narrative:       The recommended Heparin therapeutic range is 68-97 seconds.    Lipase [[id number]]  (Normal) Collected:  04/02/18 1431    Specimen:  Blood Updated:  04/02/18 1509     Lipase 114 U/L     CBC Auto Differential [[id number]]  (Abnormal) Collected:  04/02/18 1431    Specimen:  Blood Updated:  04/02/18 1458     WBC 6.18 10*3/mm3      RBC 4.12 (L) 10*6/mm3      Hemoglobin 14.8 g/dL      Hematocrit 37.4 (L) %      MCV 90.8 fL      MCH 35.9 (H) pg      MCHC 39.6 (H) g/dL      RDW 11.4 (L) %      RDW-SD 37.6 fl      MPV 9.8 fL      Platelets 197 10*3/mm3      Neutrophil % 67.0 %      Lymphocyte % 19.4 %      Monocyte % 12.3 (H) %      Eosinophil % 0.5 %      Basophil % 0.3 %      Immature Grans % 0.5 %      Neutrophils, Absolute 4.14 10*3/mm3      Lymphocytes, Absolute 1.20 10*3/mm3      Monocytes, Absolute 0.76 10*3/mm3      Eosinophils, Absolute 0.03 10*3/mm3      Basophils, Absolute 0.02 10*3/mm3      Immature Grans, Absolute 0.03 (H) 10*3/mm3         I reviewed the patient's new clinical results.    Assessment/Plan:     Active Hospital Problems (** Indicates  Principal Problem)    Diagnosis Date Noted   • **Hyponatremia [E87.1] 04/02/2018     Priority: High     Na 114 today in the ER. 2 week hx of vomiting with no food intake.  -Nephrology consulted. Will correct the sodium 8-10 MG daily point per day.  -ER started 200cc bolus hypertonic saline.  -Urine Na, Osmolality, and creatinine pending.  -Uric acid and TSH and 8 AM cortisol pending.  -CBC and CMP pending.  -repeat K and Na q4-6hr per Nephrology.  -NS IVF 70cc/hr.     • Hypokalemia [E87.6] 04/02/2018     Priority: High     Potassium 2.3 in the ER on admission.  - EKG unremarkable.  - 40meq PO and 10 IV given in the ER.  -ICU on Tele.  -Nephrology consulted.  -CMP pending.     • Hypertension [I10] 04/02/2018     -Stable  -Continue home med of Prinzide.     • Gastroesophageal reflux disease [K21.9] 04/02/2018     Stable  -Continue home medication PPI.     • Benign prostatic hyperplasia with lower urinary tract symptoms [N40.1] 04/02/2018     -Continue Flomax from home.        Resolved Hospital Problems    Diagnosis Date Noted Date Resolved   No resolved problems to display.       DVT prophylaxis: SCDs/TEDs  Code status is Full Code    Plan for disposition:Where: home and When:  electrolytes correct and pt stable.        This document has been electronically signed by Janusz Patrick MD on April 3, 2018 8:42 AM

## 2018-04-03 NOTE — PROGRESS NOTES
FAMILY MEDICINE DAILY PROGRESS NOTE  NAME: Barron Ackerman  : 1955  MRN: [id number]     LOS: 1 day     PROVIDER OF SERVICE: Kennedy Clay DO    Chief Complaint: Hyponatremia    Subjective:     Interval History:  History taken from: patient chart family  Patient Complaints: None  Patient Denies:  NV    Review of Systems:   Review of Systems   Constitutional: Negative for activity change, appetite change, fatigue and fever.   HENT: Negative for ear pain and sore throat.    Eyes: Negative for pain and visual disturbance.   Respiratory: Negative for cough and shortness of breath.    Cardiovascular: Negative for chest pain and palpitations.   Gastrointestinal: Positive for abdominal pain, nausea and vomiting.        Dysphagia   Endocrine: Negative for cold intolerance and heat intolerance.   Genitourinary: Negative for difficulty urinating and dysuria.   Musculoskeletal: Negative for arthralgias and gait problem.   Skin: Negative for color change and rash.   Neurological: Negative for dizziness, weakness and headaches.   Hematological: Negative for adenopathy. Does not bruise/bleed easily.   Psychiatric/Behavioral: Negative for agitation, confusion and sleep disturbance.       Objective:     Vital Signs  Temp:  [98.5 °F (36.9 °C)-98.8 °F (37.1 °C)] 98.7 °F (37.1 °C)  Heart Rate:  [66-87] 66  Resp:  [16-20] 20  BP: (101-136)/(61-85) 132/75    Physical Exam  Physical Exam   Constitutional: He is oriented to person, place, and time. He appears well-developed and well-nourished.   HENT:   Head: Normocephalic.   Right Ear: External ear normal.   Left Ear: External ear normal.   Nose: Nose normal.   Mouth/Throat: Oropharynx is clear and moist.   Eyes: Conjunctivae and EOM are normal. Pupils are equal, round, and reactive to light.   Neck: Normal range of motion. Neck supple.   Cardiovascular: Normal rate, regular rhythm, normal heart sounds and intact distal pulses.    Pulmonary/Chest: Effort normal and breath  sounds normal.   Abdominal: Soft. Bowel sounds are normal.   Musculoskeletal: Normal range of motion.   Neurological: He is alert and oriented to person, place, and time. He has normal reflexes.   Skin: Skin is warm and dry.   Vitals reviewed.      Medication Review    Current Facility-Administered Medications:   •  acetaminophen (TYLENOL) tablet 650 mg, 650 mg, Oral, Q4H PRN, Janusz Patrick MD, 650 mg at 04/03/18 0313  •  hydrALAZINE (APRESOLINE) injection 20 mg, 20 mg, Intravenous, Q4H PRN, Janusz Patrick MD  •  lisinopril (PRINIVIL,ZESTRIL) tablet 10 mg, 10 mg, Oral, Q24H, Gilma Britt MD, 10 mg at 04/03/18 0859  •  ondansetron (ZOFRAN) tablet 4 mg, 4 mg, Oral, Q6H PRN, Janusz Patrick MD  •  pantoprazole (PROTONIX) EC tablet 40 mg, 40 mg, Oral, BID AC, Janusz Patrick MD, 40 mg at 04/03/18 0631  •  potassium chloride (MICRO-K) CR capsule 40 mEq, 40 mEq, Oral, Once, Janusz Patrick MD  •  sodium chloride 0.9 % flush 1-10 mL, 1-10 mL, Intravenous, PRN, Janusz Patrick MD  •  sodium chloride 0.9 % infusion, 70 mL/hr, Intravenous, Continuous, Gilma Britt MD, Last Rate: 70 mL/hr at 04/02/18 2117, 70 mL/hr at 04/02/18 2117  •  tamsulosin (FLOMAX) 24 hr capsule 0.4 mg, 0.4 mg, Oral, Q24H, Janusz Patrick MD, 0.4 mg at 04/03/18 0859     Diagnostic Data    Lab Results (last 24 hours)     Procedure Component Value Units Date/Time    TSH [[id number]]  (Normal) Collected:  04/03/18 0822    Specimen:  Blood Updated:  04/03/18 0950     TSH 0.700 mIU/mL     Comprehensive Metabolic Panel [[id number]]  (Abnormal) Collected:  04/03/18 0822    Specimen:  Blood Updated:  04/03/18 0925     Glucose 73 mg/dL      BUN 8 mg/dL      Creatinine 0.57 (L) mg/dL      Sodium 120 (C) mmol/L      Potassium 3.4 (L) mmol/L      Chloride 84 (L) mmol/L      CO2 23.0 mmol/L      Calcium 8.4 mg/dL      Total Protein 5.9 (L) g/dL      Albumin 3.50 g/dL      ALT (SGPT) 136 (H) U/L      AST (SGOT) 85 (H) U/L      Alkaline Phosphatase 54 U/L       Total Bilirubin 1.0 mg/dL      eGFR Non African Amer 145 (H) mL/min/1.73      eGFR  African Amer 176 (H) mL/min/1.73      Globulin 2.4 gm/dL      A/G Ratio 1.5 g/dL      BUN/Creatinine Ratio 14.0     Anion Gap 13.0 mmol/L     Magnesium [[id number]]  (Normal) Collected:  04/03/18 0822    Specimen:  Blood Updated:  04/03/18 0921     Magnesium 2.0 mg/dL     Phosphorus [[id number]]  (Normal) Collected:  04/03/18 0822    Specimen:  Blood Updated:  04/03/18 0921     Phosphorus 2.6 mg/dL     CBC & Differential [[id number]] Collected:  04/03/18 0822    Specimen:  Blood Updated:  04/03/18 0912    Narrative:       The following orders were created for panel order CBC & Differential.  Procedure                               Abnormality         Status                     ---------                               -----------         ------                     Scan Slide[[id number]]                                                                  CBC Auto Differential[[id number]]        Abnormal            Final result                 Please view results for these tests on the individual orders.    CBC Auto Differential [[id number]]  (Abnormal) Collected:  04/03/18 0822    Specimen:  Blood Updated:  04/03/18 0912     WBC 6.00 10*3/mm3      RBC 3.72 (L) 10*6/mm3      Hemoglobin 13.3 (L) g/dL      Hematocrit 34.4 (L) %      MCV 92.5 fL      MCH 35.8 (H) pg      MCHC 38.7 (H) g/dL      RDW 11.4 (L) %      RDW-SD 38.7 fl      MPV 10.0 fL      Platelets 195 10*3/mm3      Neutrophil % 62.4 %      Lymphocyte % 22.7 %      Monocyte % 12.7 (H) %      Eosinophil % 0.7 %      Basophil % 0.8 %      Immature Grans % 0.7 (H) %      Neutrophils, Absolute 3.75 10*3/mm3      Lymphocytes, Absolute 1.36 10*3/mm3      Monocytes, Absolute 0.76 10*3/mm3      Eosinophils, Absolute 0.04 10*3/mm3      Basophils, Absolute 0.05 10*3/mm3      Immature Grans, Absolute 0.04 (H) 10*3/mm3     Cortisol [[id number]]  (Normal) Collected:  04/02/18 1804    Specimen:   Blood Updated:  04/03/18 0254     Cortisol 8.70 mcg/dL     Sodium [[id number]]  (Abnormal) Collected:  04/02/18 2123    Specimen:  Blood Updated:  04/02/18 2143     Sodium 117 (C) mmol/L     Potassium [[id number]]  (Abnormal) Collected:  04/02/18 2123    Specimen:  Blood Updated:  04/02/18 2142     Potassium 3.1 (L) mmol/L     Sodium [[id number]]  (Abnormal) Collected:  04/02/18 1804    Specimen:  Blood Updated:  04/02/18 1835     Sodium 115 (C) mmol/L     Uric Acid [[id number]]  (Normal) Collected:  04/02/18 1804    Specimen:  Blood Updated:  04/02/18 1830     Uric Acid 3.2 mg/dL     Osmolality, Urine - Urine, Clean Catch [[id number]]  (Normal) Collected:  04/02/18 1639    Specimen:  Urine from Urine, Clean Catch Updated:  04/02/18 1704     Osmolality, Urine 223 mOsm/kg     Sodium, Urine, Random - Urine, Clean Catch [[id number]]  (Normal) Collected:  04/02/18 1639    Specimen:  Urine from Urine, Clean Catch Updated:  04/02/18 1656     Sodium, Urine 44 mmol/L     Urinalysis With / Culture If Indicated - Urine, Clean Catch [[id number]]  (Abnormal) Collected:  04/02/18 1639    Specimen:  Urine from Urine, Clean Catch Updated:  04/02/18 1643     Color, UA Yellow     Appearance, UA Clear     pH, UA 7.5     Specific Gravity, UA 1.007     Glucose,  mg/dL (Trace) (A)     Ketones, UA Trace (A)     Bilirubin, UA Negative     Blood, UA Negative     Protein, UA Negative     Leuk Esterase, UA Negative     Nitrite, UA Negative     Urobilinogen, UA 1.0 E.U./dL    Narrative:       Urine microscopic not indicated.    CBC & Differential [[id number]] Collected:  04/02/18 1431    Specimen:  Blood Updated:  04/02/18 1608    Narrative:       The following orders were created for panel order CBC & Differential.  Procedure                               Abnormality         Status                     ---------                               -----------         ------                     Scan Slide[[id number]]                    Normal               Final result               CBC Auto Differential[[id number]]         Abnormal            Final result                 Please view results for these tests on the individual orders.    Scan Slide [[id number]]  (Normal) Collected:  04/02/18 1431    Specimen:  Blood Updated:  04/02/18 1608     RBC Morphology Normal     WBC Morphology Normal     Platelet Morphology Normal    [place] Draw [[id number]] Collected:  04/02/18 1431    Specimen:  Blood Updated:  04/02/18 1546    Narrative:       The following orders were created for panel order [place] Draw.  Procedure                               Abnormality         Status                     ---------                               -----------         ------                     Light Blue Top[[id number]]                                    Final result               Green Top (Gel)[[id number]]                                   Final result               Lavender Top[[id number]]                                      Final result               Gold Top - SST[[id number]]                                    Final result                 Please view results for these tests on the individual orders.    Light Blue Top [[id number]] Collected:  04/02/18 1431    Specimen:  Blood Updated:  04/02/18 1546     Extra Tube hold for add-on     Comment: Auto resulted       Green Top (Gel) [[id number]] Collected:  04/02/18 1431    Specimen:  Blood Updated:  04/02/18 1546     Extra Tube Hold for add-ons.     Comment: Auto resulted.       Lavender Top [[id number]] Collected:  04/02/18 1431    Specimen:  Blood Updated:  04/02/18 1546     Extra Tube hold for add-on     Comment: Auto resulted       Gold Top - SST [[id number]] Collected:  04/02/18 1431    Specimen:  Blood Updated:  04/02/18 1546     Extra Tube Hold for add-ons.     Comment: Auto resulted.       BNP [[id number]]  (Normal) Collected:  04/02/18 1431    Specimen:  Blood Updated:  04/02/18 1519     proBNP 361.0 pg/mL     Troponin [[id number]]  (Normal) Collected:   04/02/18 1431    Specimen:  Blood Updated:  04/02/18 1519     Troponin I <0.012 ng/mL     Comprehensive Metabolic Panel [[id number]]  (Abnormal) Collected:  04/02/18 1431    Specimen:  Blood Updated:  04/02/18 1518     Glucose 102 (H) mg/dL      BUN 8 mg/dL      Creatinine 0.60 (L) mg/dL      Sodium 114 (C) mmol/L      Comment: RERAN TO CONFIRM RESULT        Potassium 2.3 (C) mmol/L      Comment: RERAN TO CONFIRM RESULT        Chloride 72 (L) mmol/L      CO2 29.0 mmol/L      Calcium 8.8 mg/dL      Total Protein 6.8 g/dL      Albumin 4.20 g/dL      ALT (SGPT) 152 (H) U/L      AST (SGOT) 97 (H) U/L      Alkaline Phosphatase 65 U/L      Total Bilirubin 1.4 (H) mg/dL      eGFR Non African Amer 137 (H) mL/min/1.73      eGFR  African Amer 165 (H) mL/min/1.73      Globulin 2.6 gm/dL      A/G Ratio 1.6 g/dL      BUN/Creatinine Ratio 13.3     Anion Gap 13.0 mmol/L     Protime-INR [[id number]]  (Normal) Collected:  04/02/18 1431    Specimen:  Blood Updated:  04/02/18 1516     Protime 11.9 Seconds      INR 0.89    Narrative:       Therapeutic range for most indications is 2.0-3.0 INR,  or 2.5-3.5 for mechanical heart valves.    aPTT [[id number]]  (Normal) Collected:  04/02/18 1431    Specimen:  Blood Updated:  04/02/18 1516     PTT 25.2 seconds     Narrative:       The recommended Heparin therapeutic range is 68-97 seconds.    Lipase [[id number]]  (Normal) Collected:  04/02/18 1431    Specimen:  Blood Updated:  04/02/18 1509     Lipase 114 U/L     CBC Auto Differential [[id number]]  (Abnormal) Collected:  04/02/18 1431    Specimen:  Blood Updated:  04/02/18 1458     WBC 6.18 10*3/mm3      RBC 4.12 (L) 10*6/mm3      Hemoglobin 14.8 g/dL      Hematocrit 37.4 (L) %      MCV 90.8 fL      MCH 35.9 (H) pg      MCHC 39.6 (H) g/dL      RDW 11.4 (L) %      RDW-SD 37.6 fl      MPV 9.8 fL      Platelets 197 10*3/mm3      Neutrophil % 67.0 %      Lymphocyte % 19.4 %      Monocyte % 12.3 (H) %      Eosinophil % 0.5 %      Basophil % 0.3 %       Immature Grans % 0.5 %      Neutrophils, Absolute 4.14 10*3/mm3      Lymphocytes, Absolute 1.20 10*3/mm3      Monocytes, Absolute 0.76 10*3/mm3      Eosinophils, Absolute 0.03 10*3/mm3      Basophils, Absolute 0.02 10*3/mm3      Immature Grans, Absolute 0.03 (H) 10*3/mm3             I reviewed the patient's new clinical results.  I reviewed the patient's new imaging results and agree with the interpretation.  I reviewed the patient's other test results and agree with the interpretation    Assessment/Plan:     Active Hospital Problems (** Indicates Principal Problem)    Diagnosis Date Noted   • **Hyponatremia [E87.1] 04/02/2018     Na 114 today in the ER. 2 week hx of vomiting with no food intake.  -Nephrology consulted. Will correct the sodium 8-10 MG daily point per day.  -ER started 200cc bolus hypertonic saline.  -Urine Na, Osmolality, and creatinine pending.  -Uric acid and TSH and 8 AM cortisol pending.  -CBC and CMP pending.  -repeat K and Na q4-6hr per Nephrology.  -NS IVF 70cc/hr.     • Hypokalemia [E87.6] 04/02/2018     Potassium 2.3 in the ER on admission.  - EKG unremarkable.  - 40meq PO and 10 IV given in the ER.  -ICU on Tele.  -Nephrology consulted.  -CMP pending.     • Hypertension [I10] 04/02/2018     -Stable  -Continue home med of Prinzide.     • Gastroesophageal reflux disease [K21.9] 04/02/2018     Stable  -Continue home medication PPI.     • Benign prostatic hyperplasia with lower urinary tract symptoms [N40.1] 04/02/2018     -Continue Flomax from home.        Resolved Hospital Problems    Diagnosis Date Noted Date Resolved   No resolved problems to display.       Electrolyte disturbance correcting per nephrology   Will investigate dysphagia.    Code status is Full Code    Plan for disposition:Where: To floor    I have examined the patient and reviewed the pertinent diagnostic data. I have discussed the case with the Family Medicine Resident and agree with the assessment and plan of  care.    Kennedy Clay DO           This document has been electronically signed by Kennedy Clay DO on April 3, 2018 10:55 AM

## 2018-04-03 NOTE — PLAN OF CARE
Problem: Fall Risk (Adult)  Goal: Identify Related Risk Factors and Signs and Symptoms  Patient has had no episodes of falls or injury this shift.   04/03/18 0337   Fall Risk (Adult)   Related Risk Factors (Fall Risk) sensory deficits;environment unfamiliar   Signs and Symptoms (Fall Risk) presence of risk factors     Goal: Absence of Fall   04/03/18 0337   Fall Risk (Adult)   Absence of Fall making progress toward outcome       Problem: Patient Care Overview  Goal: Plan of Care Review   04/03/18 0337   Coping/Psychosocial   Plan of Care Reviewed With patient   Coping/Psychosocial   Patient Agreement with Plan of Care agrees   Plan of Care Review   Progress improving   OTHER   Outcome Summary Pt is improving. Vital signs stable this shift. Given K+ runs for potassium of 3.1. Will recheck labwork at 0600. Pt denies nausea and vomiting this shift. Tolerating PO clear liquids well. Alert and oriented. Will continue to monitor closely.     Goal: Individualization and Mutuality   04/03/18 0337   Personal Strengths/Vulnerabilities   Patient Personal Strengths family/social support;independent living skills;positive vocational history;positive attitude;stable living environment   Patient Vulnerabilities Pt is a current every day smoker and alcohol user.   Individualization   Patient Specific Preferences Pt likes warm blankets and strawberry jello.   Patient Specific Goals (Include Timeframe) Pt wishes for sodium levels to normalize and return home before the weekend.   Mutuality/Individual Preferences   What Anxieties, Fears, Concerns, or Questions Do You Have About Your Care? None at this time   How Would You and/or Your Support Person Like to Participate in Your Care? Include in daily plan of care.   Mutuality/Individual Preferences   How to Address Anxieties/Fears Discussion     Goal: Discharge Needs Assessment   04/02/18 1700 04/03/18 0337   Discharge Needs Assessment   Readmission Within the Last 30 Days --  no  previous admission in last 30 days   Concerns to be Addressed --  no discharge needs identified   Patient/Family Anticipates Transition to home with family --    Patient/Family Anticipated Services at Transition  --    Transportation Concerns --  car, none   Transportation Anticipated family or friend will provide --    Discharge Needs Assessment,    Anticipated Discharge Disposition --  home or self-care       Problem: Nausea/Vomiting (Adult)  Goal: Identify Related Risk Factors and Signs and Symptoms   04/03/18 0337   Nausea/Vomiting (Adult)   Related Risk Factors (Nausea/Vomiting) chronic alcohol use   Signs and Symptoms (Nausea/Vomiting) electrolyte changes     Goal: Symptom Relief  Patient denies nausea/vomiting this shift.   04/03/18 0337   Nausea/Vomiting (Adult)   Symptom Relief making progress toward outcome     Goal: Adequate Hydration   04/03/18 0337   Nausea/Vomiting (Adult)   Adequate Hydration making progress toward outcome

## 2018-04-03 NOTE — PAYOR COMM NOTE
"Barron Lehman (62 y.o. Male)     Date of Birth Social Security Number Address Home Phone MRN    1955  [address] [place] [address] [phone] [id number]    Congregational Marital Status          Spiritism        Admission Date Admission Type Admitting Provider Attending Provider Department, Room/Bed    4/2/18 Emergency Shay Brewster MD Holler, Benjamin, MD [facility] CRITICAL CARE, 13/A    Discharge Date Discharge Disposition Discharge Destination                       Attending Provider:  Jaunsz Patrick MD    Allergies:  No Known Allergies    Isolation:  None   Infection:  None   Code Status:  FULL    Ht:  177.8 cm (70\")   Wt:  72.9 kg (160 lb 11.5 oz)    Admission Cmt:  None   Principal Problem:  Hyponatremia [E87.1] More...                 Active Insurance as of 4/2/2018     Primary Coverage     Payor Plan Insurance Group Employer/Plan Group    PASSPORT PASSPORT MEDICAID     Payor Plan Address Payor Plan Phone Number Effective From Effective To    [address] [phone] 1/1/2016     [place] [address]       Subscriber Name Subscriber Birth Date Member ID       BARRON LEHMAN 1955 [id number]                 Emergency Contacts      (Rel.) Home Phone Work Phone Mobile Phone    April Lehman (Spouse) [phone] -- [phone]               History & Physical      Philippe Sivakumar Hickey MD at 4/2/2018  7:15 PM          Hyponatremia    Date of Admission: 4/2/2018  Subjective:     Barron Lehman is a 62 y.o. male who presents for N/V and generalized weakness. Patient states symptoms started about 2 weeks ago. He states feeling sick to his stomach and would have several episodes of NBNB vomiting. He states that eating solid foods made him vomit. He was able to hold down some chicken broth and grits without issue. He states that due to all of this, felt weak overall. No sick contacts reported. No fevers/chills, no diarrhea reported. No abdominal pain " reported. Patient states that due to not eating much, he has lost over 10 pounds since symptoms started. He states that he has had intermittent episodes where he feels dizzy. He states that one instance, he felt lightheaded, and fell backwards and hit a door, he did not faint or lose consciousness. No reports of seizure like episodes or any syncopal episodes.      The following portions of the patient's history were reviewed and updated as appropriate: allergies, current medications, past family history, past medical history, past social history, past surgical history and problem list.    Concurrent Medical Hx:  Past Medical History:   Diagnosis Date   • Hypertension        Past Surgical Hx:  Past Surgical History:   Procedure Laterality Date   • HERNIA REPAIR       Family Hx:  History reviewed. No pertinent family history.   Social History:  Social History     Social History   • Marital status:      Spouse name: N/A   • Number of children: N/A   • Years of education: N/A     Occupational History   • Not on file.     Social History Main Topics   • Smoking status: Current Some Day Smoker   • Smokeless tobacco: Never Used   • Alcohol use Yes      Comment: occassional   • Drug use: No   • Sexual activity: Not on file     Other Topics Concern   • Not on file     Social History Narrative   • No narrative on file       Home Medications:  No current facility-administered medications on file prior to encounter.      No current outpatient prescriptions on file prior to encounter.       Allergies:  Review of patient's allergies indicates no known allergies.  I reviewed the patient's new clinical results.  I reviewed the patient's new imaging results and agree with the interpretation.  I reviewed the patient's other test results and agree with the interpretation  I personally viewed and interpreted the patient's EKG/Telemetry data  Review of Systems  Review of Systems   Constitutional: Positive for appetite change.  "Negative for chills and fever.   HENT: Negative for congestion, ear pain, rhinorrhea, sneezing and sore throat.    Respiratory: Negative for cough and shortness of breath.    Cardiovascular: Negative for chest pain, palpitations and leg swelling.   Gastrointestinal: Positive for nausea and vomiting. Negative for diarrhea.   Endocrine: Negative for polyphagia and polyuria.   Musculoskeletal: Negative for back pain and neck pain.   Skin: Negative for color change and rash.   Neurological: Negative for dizziness, syncope and weakness.   Psychiatric/Behavioral: Negative for agitation, confusion and dysphoric mood.       Objective:     /80   Pulse 85   Temp 98.5 °F (36.9 °C) (Oral)   Resp 18   Ht 177.8 cm (70\")   Wt 71.9 kg (158 lb 8.2 oz)   SpO2 100%   BMI 22.74 kg/m²    Physical Exam   Constitutional: He is oriented to person, place, and time. He appears well-developed and well-nourished.   Cardiovascular: Normal rate, regular rhythm and normal heart sounds.  Exam reveals no gallop and no friction rub.    No murmur heard.  Pulmonary/Chest: Effort normal and breath sounds normal. No respiratory distress. He has no wheezes. He has no rales.   Abdominal: Soft. Bowel sounds are normal. There is no tenderness.   Musculoskeletal: Normal range of motion. He exhibits no edema.   Neurological: He is alert and oriented to person, place, and time.   Skin: Skin is warm and dry.   Psychiatric: He has a normal mood and affect. His behavior is normal.   Vitals reviewed.       I reviewed the patient's new clinical results.  I reviewed the patient's new imaging results.  I reviewed the patient's other test results.  I personally viewed the patient's EKG/Telemetry data  Assessment/Plan:     Active Hospital Problems (** Indicates Principal Problem)    Diagnosis Date Noted   • **Hyponatremia [E87.1] 04/02/2018     Na 114 today in the ER. 2 week hx of vomiting with no food intake.  -Nephrology consulted. Will correct the " sodium 8-10 MG daily point per day.  -ER started 200cc bolus hypertonic saline.  -Urine Na, Osmolality, and creatinine pending.  -Uric acid and TSH and 8 AM cortisol.  -Rechecking sodium levels this evening, nephrology will most likely hold hypertonic saline and start patient on normal saline.     • Hypokalemia [E87.6] 2018     Potassium 2.3 in the ER on admission.  - EKG unremarkable.  - 40meq PO and 10 IV given in the ER.  - Give additional 80meq tonight PO.  -ICU on Tele.  -Nephrology consulted.     • Hypertension [I10] 2018     -Stable  -Continue home med of Prinzide.     • Gastroesophageal reflux disease [K21.9] 2018     Stable  -Continue home medication PPI.     • Benign prostatic hyperplasia with lower urinary tract symptoms [N40.1] 2018     -Continue Flomax from home.        Resolved Hospital Problems    Diagnosis Date Noted Date Resolved   No resolved problems to display.       I have seen and examined the patient.  I have reviewed the notes, assessments, and/or procedures performed by Dr. Patrick, I concur with her/his documentation and assessment and plan for Barron Tyron.      This document has been electronically signed by Philippe Hickey MD on 2018 7:15 PM       Electronically signed by Philippe Hickey MD at 2018  8:11 PM     Janusz Patrick MD at 2018  5:24 PM     Attestation signed by Philippe Hickey MD at 2018  7:15 PM    I have seen the patient.  I have reviewed the notes, assessments, and/or procedures performed by Dr. Patrick, I concur with her/his documentation and assessment and plan for Barron Tyron.       This document has been electronically signed by Philippe Hickey MD on 2018 7:15 PM                         HISTORY AND PHYSICAL  NAME: Barron Ackerman  : 1955  MRN: [id number]    DATE OF ADMISSION: 18    DATE & TIME SEEN: 18 6:51 PM    PCP: [facility] Celestin    CODE STATUS: Full    CHIEF COMPLAINT  vomiting    HPI:  Barron Ackerman is a 62 y.o. male for the last 2 weeks has had flulike symptoms including muscle aches, joint aches, cough, nasal congestion, rhinorrhea, cough, with some nausea and vomiting.  He has not been able to keep anything down for for 2 weeks.  He has been able to drink approximately 36 ounces of water a day.  Patient denies any fevers or chills.  He has had a couple episodes of diarrhea.  He becomes lightheaded and dizzy when he stands up, this started yesterday.  For the past 2-3 days he's had worsening cough is causing chest pain.  He went to the doctor's today who did an EKG and sent him to the ER via EMS.  Patient has not tried anything at home medication wise to help with his symptoms.  He reports having lost 12 pounds in the last 2 weeks.    In the ER he was found to have a sodium of 114 and potassium of 2.3.  He was given 40 KCl by mouth and 10 IV.  Patient started on IV 20cc/hr hypertonic saline.nephrology was consulted Nephrology was consulted.  EKG showed normal sinus rhythm, troponin was negative.     CONCURRENT MEDICAL HISTORY:  Past Medical History:   Diagnosis Date   • Hypertension        PAST SURGICAL HISTORY:  Past Surgical History:   Procedure Laterality Date   • HERNIA REPAIR         FAMILY HISTORY:  History reviewed. No pertinent family history.     SOCIAL HISTORY:  Social History     Social History   • Marital status:      Spouse name: N/A   • Number of children: N/A   • Years of education: N/A     Occupational History   • Not on file.     Social History Main Topics   • Smoking status: Current Some Day Smoker   • Smokeless tobacco: Never Used   • Alcohol use Yes      Comment: occassional   • Drug use: No   • Sexual activity: Not on file     Other Topics Concern   • Not on file     Social History Narrative   • No narrative on file       HOME MEDICATIONS:  Prescriptions Prior to Admission   Medication Sig Dispense Refill Last Dose   • esomeprazole (NEXIUM) 40 MG  capsule Take 40 mg by mouth Daily.   4/2/2018 at 0900   • lisinopril-hydrochlorothiazide (PRINZIDE,ZESTORETIC) 10-12.5 MG per tablet Take 1 tablet by mouth Daily.   4/2/2018 at 0900   • tamsulosin (FLOMAX) 0.4 MG capsule 24 hr capsule Take 0.4 mg by mouth Daily.      • amoxicillin (AMOXIL) 500 MG capsule Take 500 mg by mouth 2 (Two) Times a Day. For 10 days. Per pt/wife, started taking about 4 days ago. Pt has only been taking once daily.   Unknown at Unknown time       ALLERGIES:  Review of patient's allergies indicates no known allergies.    REVIEW OF SYSTEMS  Review of Systems   Constitutional: Positive for activity change, appetite change, fatigue and unexpected weight change. Negative for chills, diaphoresis and fever.   HENT: Positive for congestion, rhinorrhea and sneezing. Negative for ear pain and sore throat.    Eyes: Negative for pain, redness, itching and visual disturbance.   Respiratory: Positive for cough. Negative for choking, chest tightness, shortness of breath, wheezing and stridor.    Cardiovascular: Positive for chest pain. Negative for palpitations and leg swelling.   Gastrointestinal: Positive for diarrhea, nausea and vomiting. Negative for abdominal distention, abdominal pain, blood in stool, constipation and rectal pain.   Genitourinary: Negative for difficulty urinating, dysuria, flank pain and frequency.   Skin: Negative for color change and rash.   Neurological: Positive for dizziness, weakness and light-headedness. Negative for tremors, seizures, syncope, facial asymmetry, speech difficulty, numbness and headaches.   Psychiatric/Behavioral: Negative for agitation, confusion, decreased concentration and sleep disturbance. The patient is not nervous/anxious.    All other systems reviewed and are negative.      PHYSICAL EXAM:  Temp:  [98.5 °F (36.9 °C)] 98.5 °F (36.9 °C)  Heart Rate:  [80-87] 85  Resp:  [18] 18  BP: (101-135)/(61-85) 135/80  Body mass index is 22.74 kg/m².     Physical Exam    Constitutional: He is oriented to person, place, and time. He appears well-developed and well-nourished.  Non-toxic appearance. He does not have a sickly appearance. He appears ill. No distress.   HENT:   Head: Normocephalic and atraumatic.   Right Ear: External ear normal. No lacerations. No swelling or tenderness.   Left Ear: External ear normal. No lacerations. No swelling or tenderness.   Nose: Mucosal edema present. Right sinus exhibits no maxillary sinus tenderness and no frontal sinus tenderness. Left sinus exhibits no maxillary sinus tenderness and no frontal sinus tenderness.   Mouth/Throat: Uvula is midline, oropharynx is clear and moist and mucous membranes are normal.   Eyes: Conjunctivae, EOM and lids are normal. Pupils are equal, round, and reactive to light. No scleral icterus.   Neck: No tracheal deviation present. No thyromegaly present.   Cardiovascular: Normal rate, regular rhythm, normal heart sounds and intact distal pulses.  Exam reveals no gallop, no distant heart sounds and no friction rub.    No murmur heard.  Pulses:       Carotid pulses are 2+ on the right side, and 2+ on the left side.       Radial pulses are 2+ on the right side, and 2+ on the left side.        Dorsalis pedis pulses are 2+ on the right side, and 2+ on the left side.        Posterior tibial pulses are 2+ on the right side, and 2+ on the left side.   Pulmonary/Chest: Effort normal. No accessory muscle usage or stridor. No respiratory distress. He has no decreased breath sounds. He has no wheezes. He has rhonchi in the left lower field. He has no rales. He exhibits no tenderness.   Abdominal: Soft. Bowel sounds are normal. He exhibits no shifting dullness, no distension and no ascites. There is no tenderness. There is no rigidity, no rebound and no guarding.   Musculoskeletal:        Right lower leg: He exhibits no swelling.        Left lower leg: He exhibits no swelling.        Right foot: There is no swelling.         Left foot: There is no swelling.     Vascular Status -  His right foot exhibits normal foot vasculature  and no edema. His left foot exhibits normal foot vasculature  and no edema.  Lymphadenopathy:     He has no cervical adenopathy.   Neurological: He is alert and oriented to person, place, and time. He has normal strength. No cranial nerve deficit or sensory deficit. He exhibits normal muscle tone. GCS eye subscore is 4. GCS verbal subscore is 5. GCS motor subscore is 6.   Skin: Skin is warm and dry. Capillary refill takes less than 2 seconds. No rash noted. He is not diaphoretic. No erythema. No pallor.   Psychiatric: He has a normal mood and affect. His speech is normal.   Nursing note and vitals reviewed.      DIAGNOSTIC DATA:   Lab Results (last 24 hours)     Procedure Component Value Units Date/Time    Sodium [[id number]]  (Abnormal) Collected:  04/02/18 1804    Specimen:  Blood Updated:  04/02/18 1835     Sodium 115 (C) mmol/L     Uric Acid [[id number]]  (Normal) Collected:  04/02/18 1804    Specimen:  Blood Updated:  04/02/18 1830     Uric Acid 3.2 mg/dL     Osmolality, Urine - Urine, Clean Catch [[id number]]  (Normal) Collected:  04/02/18 1639    Specimen:  Urine from Urine, Clean Catch Updated:  04/02/18 1704     Osmolality, Urine 223 mOsm/kg     Sodium, Urine, Random - Urine, Clean Catch [[id number]]  (Normal) Collected:  04/02/18 1639    Specimen:  Urine from Urine, Clean Catch Updated:  04/02/18 1656     Sodium, Urine 44 mmol/L     Urinalysis With / Culture If Indicated - Urine, Clean Catch [[id number]]  (Abnormal) Collected:  04/02/18 1639    Specimen:  Urine from Urine, Clean Catch Updated:  04/02/18 1643     Color, UA Yellow     Appearance, UA Clear     pH, UA 7.5     Specific Gravity, UA 1.007     Glucose,  mg/dL (Trace) (A)     Ketones, UA Trace (A)     Bilirubin, UA Negative     Blood, UA Negative     Protein, UA Negative     Leuk Esterase, UA Negative     Nitrite, UA Negative     Urobilinogen, UA  1.0 E.U./dL    Narrative:       Urine microscopic not indicated.    CBC & Differential [[id number]] Collected:  04/02/18 1431    Specimen:  Blood Updated:  04/02/18 1608    Narrative:       The following orders were created for panel order CBC & Differential.  Procedure                               Abnormality         Status                     ---------                               -----------         ------                     Scan Slide[[id number]]                    Normal              Final result               CBC Auto Differential[[id number]]         Abnormal            Final result                 Please view results for these tests on the individual orders.    Scan Slide [[id number]]  (Normal) Collected:  04/02/18 1431    Specimen:  Blood Updated:  04/02/18 1608     RBC Morphology Normal     WBC Morphology Normal     Platelet Morphology Normal    [place] Draw [[id number]] Collected:  04/02/18 1431    Specimen:  Blood Updated:  04/02/18 1546    Narrative:       The following orders were created for panel order [place] Draw.  Procedure                               Abnormality         Status                     ---------                               -----------         ------                     Light Blue Top[[id number]]                                    Final result               Green Top (Gel)[[id number]]                                   Final result               Lavender Top[[id number]]                                      Final result               Gold Top - SST[[id number]]                                    Final result                 Please view results for these tests on the individual orders.    Light Blue Top [[id number]] Collected:  04/02/18 1431    Specimen:  Blood Updated:  04/02/18 1546     Extra Tube hold for add-on     Comment: Auto resulted       Green Top (Gel) [[id number]] Collected:  04/02/18 1431    Specimen:  Blood Updated:  04/02/18 1546     Extra Tube Hold for add-ons.     Comment: Auto resulted.        Lavender Top [[id number]] Collected:  04/02/18 1431    Specimen:  Blood Updated:  04/02/18 1546     Extra Tube hold for add-on     Comment: Auto resulted       Gold Top - SST [[id number]] Collected:  04/02/18 1431    Specimen:  Blood Updated:  04/02/18 1546     Extra Tube Hold for add-ons.     Comment: Auto resulted.       BNP [[id number]]  (Normal) Collected:  04/02/18 1431    Specimen:  Blood Updated:  04/02/18 1519     proBNP 361.0 pg/mL     Troponin [[id number]]  (Normal) Collected:  04/02/18 1431    Specimen:  Blood Updated:  04/02/18 1519     Troponin I <0.012 ng/mL     Comprehensive Metabolic Panel [[id number]]  (Abnormal) Collected:  04/02/18 1431    Specimen:  Blood Updated:  04/02/18 1518     Glucose 102 (H) mg/dL      BUN 8 mg/dL      Creatinine 0.60 (L) mg/dL      Sodium 114 (C) mmol/L      Comment: RERAN TO CONFIRM RESULT        Potassium 2.3 (C) mmol/L      Comment: RERAN TO CONFIRM RESULT        Chloride 72 (L) mmol/L      CO2 29.0 mmol/L      Calcium 8.8 mg/dL      Total Protein 6.8 g/dL      Albumin 4.20 g/dL      ALT (SGPT) 152 (H) U/L      AST (SGOT) 97 (H) U/L      Alkaline Phosphatase 65 U/L      Total Bilirubin 1.4 (H) mg/dL      eGFR Non African Amer 137 (H) mL/min/1.73      eGFR  African Amer 165 (H) mL/min/1.73      Globulin 2.6 gm/dL      A/G Ratio 1.6 g/dL      BUN/Creatinine Ratio 13.3     Anion Gap 13.0 mmol/L     Protime-INR [[id number]]  (Normal) Collected:  04/02/18 1431    Specimen:  Blood Updated:  04/02/18 1516     Protime 11.9 Seconds      INR 0.89    Narrative:       Therapeutic range for most indications is 2.0-3.0 INR,  or 2.5-3.5 for mechanical heart valves.    aPTT [[id number]]  (Normal) Collected:  04/02/18 1431    Specimen:  Blood Updated:  04/02/18 1516     PTT 25.2 seconds     Narrative:       The recommended Heparin therapeutic range is 68-97 seconds.    Lipase [[id number]]  (Normal) Collected:  04/02/18 1431    Specimen:  Blood Updated:  04/02/18 1509     Lipase 114 U/L     CBC  Auto Differential [[id number]]  (Abnormal) Collected:  04/02/18 1431    Specimen:  Blood Updated:  04/02/18 1458     WBC 6.18 10*3/mm3      RBC 4.12 (L) 10*6/mm3      Hemoglobin 14.8 g/dL      Hematocrit 37.4 (L) %      MCV 90.8 fL      MCH 35.9 (H) pg      MCHC 39.6 (H) g/dL      RDW 11.4 (L) %      RDW-SD 37.6 fl      MPV 9.8 fL      Platelets 197 10*3/mm3      Neutrophil % 67.0 %      Lymphocyte % 19.4 %      Monocyte % 12.3 (H) %      Eosinophil % 0.5 %      Basophil % 0.3 %      Immature Grans % 0.5 %      Neutrophils, Absolute 4.14 10*3/mm3      Lymphocytes, Absolute 1.20 10*3/mm3      Monocytes, Absolute 0.76 10*3/mm3      Eosinophils, Absolute 0.03 10*3/mm3      Basophils, Absolute 0.02 10*3/mm3      Immature Grans, Absolute 0.03 (H) 10*3/mm3            Imaging Results (last 24 hours)     Procedure Component Value Units Date/Time    XR Abdomen 2 View With Chest 1 View [[id number]] Collected:  04/02/18 1436     Updated:  04/02/18 1501    Narrative:         EXAMINATION:  Acute abdominal series              VIEWS:  Chest:  1 ; Abdo:  2   DATE/TIME:     4/2/2018 2:59 PM CDT      INDICATION: vomiting , chest pain ,  COMPARISON EXAMINATION:    none                              FINDINGS:                             Chest:      heart size: within normal limits      mediastinal contour: within normal limits       lungs: no evidence of focal air space disease, grossly  negative      pleura:  no pleural fluid      osseous: limited, grossly negative for age.      misc:    Abdomen:      bowel gas pattern: nonobstructive      free air:  none visualized      calculi:   none visualized      osseous:  limited, grossly negative for age.      misc:        Impression:       CONCLUSION:        1. Negative examination.                                If pain or symptoms persist beyond reasonable expectations, a  contrast enhanced CT examination is suggested, as is deemed  clinical appropriate.                       Electronically  signed by:  JOSE Cunha MD  4/2/2018 3:00 PM  CDT Workstation: [id number]          I reviewed the patient's new clinical results.    ASSESSMENT AND PLAN: This is a 62 y.o. male with:    Active Hospital Problems (** Indicates Principal Problem)    Diagnosis Date Noted   • **Hyponatremia [E87.1] 04/02/2018     Priority: High     Na 114 today in the ER. 2 week hx of vomiting with no food intake.  -Nephrology consulted. Will correct the sodium 8-10 MG daily point per day.  -ER started 200cc bolus hypertonic saline.  -Urine Na, Osmolality, and creatinine pending.  -Uric acid and TSH and 8 AM cortisol.  -Rechecking sodium levels this evening, nephrology will most likely hold hypertonic saline and start patient on normal saline.     • Hypokalemia [E87.6] 04/02/2018     Priority: High     Potassium 2.3 in the ER on admission.  - EKG unremarkable.  - 40meq PO and 10 IV given in the ER.  - Give additional 80meq tonight PO.  -ICU on Tele.  -Nephrology consulted.     • Hypertension [I10] 04/02/2018     -Stable  -Continue home med of Prinzide.     • Gastroesophageal reflux disease [K21.9] 04/02/2018     Stable  -Continue home medication PPI.     • Benign prostatic hyperplasia with lower urinary tract symptoms [N40.1] 04/02/2018     -Continue Flomax from home.        Resolved Hospital Problems    Diagnosis Date Noted Date Resolved   No resolved problems to display.     DVT prophylaxis: SCDs/TEDs  Code status is Full  [facility] # [id number], reviewed and consistent with patient reported medications.    I discussed the patients findings and my recommendations with patient and family.     Ruperto is the attending on record at time of admission, is aware of the patient's status and agrees with the above history and physical.        This document has been electronically signed by Janusz Patrick MD on April 2, 2018 6:51 PM    Electronically signed by Philippe Hickey MD at 4/2/2018  7:15 PM          Emergency Department Notes       Lee Pimentel MD at 4/2/2018  2:46 PM      Procedure Orders:    1. ECG 12 Lead [[id number]] ordered by Lee Pimentel MD at 04/02/18 1536                Subjective   62 years old male with history of acid reflux, hypertension is brought in the ER by EMS from primary care office with a chief complaint of upper back pain and some chest tightness.  He is given aspirin and one  nitroglycerin which improved his symptoms.  Patient reports that he is has been having on and off vomiting daily ×1 for almost 1 week.  No hematemesis.  He has been feeling weak and tired all over after he got a flu couple of weeks ago.  He was feeling dizzy and lightheaded yesterday while he was out.  He is not dizzy or lightheaded now.  He does not have any chest pain at present. Patient report pain in the upper back with some chest tightness.  No shortness of breath.  No limitations.  No fever or chills reported.        History provided by:  Patient and EMS personnel  Chest Pain   Chest pain location: Upper back.  Pain quality: dull    Pain radiates to:  Does not radiate  Pain severity:  Moderate  Onset quality:  Sudden  Timing:  Constant  Progression:  Resolved  Chronicity:  New  Relieved by:  Aspirin and nitroglycerin  Worsened by:  Nothing  Associated symptoms: back pain, dizziness and fatigue    Associated symptoms: no abdominal pain, no nausea, no numbness, no palpitations, no shortness of breath and no weakness    Dizziness:     Severity:  Mild    Duration:  1 day    Timing:  Constant    Progression:  Resolved  Risk factors: male sex and smoking        Review of Systems   Constitutional: Positive for fatigue. Negative for chills.   HENT: Negative for congestion, ear pain and sinus pressure.    Eyes: Negative for pain.   Respiratory: Positive for chest tightness. Negative for shortness of breath and wheezing.    Cardiovascular: Negative for chest pain and palpitations.   Gastrointestinal: Negative for abdominal distention, abdominal pain and  nausea.   Genitourinary: Negative for flank pain.   Musculoskeletal: Positive for back pain.   Neurological: Positive for dizziness. Negative for speech difficulty, weakness and numbness.   Hematological: Negative for adenopathy.   Psychiatric/Behavioral: Negative for agitation.     Hospital in the  Past Medical History:   Diagnosis Date   • Hypertension        No Known Allergies    Past Surgical History:   Procedure Laterality Date   • HERNIA REPAIR         History reviewed. No pertinent family history.    Social History     Social History   • Marital status:      Social History Main Topics   • Smoking status: Current Some Day Smoker   • Smokeless tobacco: Never Used   • Alcohol use Yes      Comment: occassional   • Drug use: No     Other Topics Concern   • Not on file           Objective   Physical Exam   Constitutional: He is oriented to person, place, and time. He appears well-developed and well-nourished.   HENT:   Head: Normocephalic and atraumatic.   Nose: Nose normal.   Mouth/Throat: Oropharynx is clear and moist.   Eyes: Conjunctivae are normal.   Neck: Normal range of motion. Neck supple.   Cardiovascular: Normal rate, regular rhythm and normal heart sounds.    Pulmonary/Chest: Effort normal and breath sounds normal.   Abdominal: Soft. Bowel sounds are normal. He exhibits no distension. There is no guarding.   Musculoskeletal: Normal range of motion.   Neurological: He is alert and oriented to person, place, and time. He has normal reflexes. He displays normal reflexes. No cranial nerve deficit or sensory deficit. He exhibits normal muscle tone. Coordination normal. GCS eye subscore is 4. GCS verbal subscore is 5. GCS motor subscore is 6. He displays no Babinski's sign on the right side. He displays no Babinski's sign on the left side.   Reflex Scores:       Bicep reflexes are 2+ on the right side and 2+ on the left side.       Patellar reflexes are 2+ on the right side and 2+ on the left  side.  Skin: Skin is warm and dry. Capillary refill takes less than 2 seconds.   Psychiatric: He has a normal mood and affect.   Nursing note and vitals reviewed.      ECG 12 Lead    Date/Time: 4/2/2018 3:36 PM  Performed by: ADARSH DAMIAN  Authorized by: ADARSH DAMIAN   Interpreted by physician  Rhythm: sinus rhythm  Rate: normal  BPM: 85  QRS axis: normal  Conduction: right bundle branch block  T depression: V1 and V2  Clinical impression: abnormal ECG              ED Course  ED Course                  MDM  Number of Diagnoses or Management Options  Chest pain, unspecified type:   General weakness:   Hypokalemia:   Hyponatremia:   Diagnosis management comments: 62 years old male is evaluated in the ER for generalized weakness/fatigue with vomiting and chest discomfort.  He is given IV fluid bolus.  EKG shows normal sinus rhythm with right bundle branch block and no acute elevation.  Has negative initial troponin.  Chemistry profile showed marked hypokalemia and hyponatremia which explain his symptoms.  Discussed with nephrology Dr. Britt, started on 3% normal saline at 20/h.  We will replace potassium also.  I have discussed with Dr. Brewster resident on call and patient is being admitted to ICU       Amount and/or Complexity of Data Reviewed  Clinical lab tests: ordered and reviewed  Tests in the radiology section of CPT®:  ordered and reviewed      Labs Reviewed   COMPREHENSIVE METABOLIC PANEL - Abnormal; Notable for the following:        Result Value    Glucose 102 (*)     Creatinine 0.60 (*)     Sodium 114 (*)     Potassium 2.3 (*)     Chloride 72 (*)     ALT (SGPT) 152 (*)     AST (SGOT) 97 (*)     Total Bilirubin 1.4 (*)     eGFR Non  Amer 137 (*)     eGFR   Amer 165 (*)     All other components within normal limits   URINALYSIS W/ CULTURE IF INDICATED - Abnormal; Notable for the following:     Glucose,  mg/dL (Trace) (*)     Ketones, UA Trace (*)     All other components within normal  limits    Narrative:     Urine microscopic not indicated.   CBC WITH AUTO DIFFERENTIAL - Abnormal; Notable for the following:     RBC 4.12 (*)     Hematocrit 37.4 (*)     MCH 35.9 (*)     MCHC 39.6 (*)     RDW 11.4 (*)     Monocyte % 12.3 (*)     Immature Grans, Absolute 0.03 (*)     All other components within normal limits   PROTIME-INR - Normal    Narrative:     Therapeutic range for most indications is 2.0-3.0 INR,  or 2.5-3.5 for mechanical heart valves.   APTT - Normal    Narrative:     The recommended Heparin therapeutic range is 68-97 seconds.   LIPASE - Normal   BNP (IN-HOUSE) - Normal   TROPONIN (IN-HOUSE) - Normal   SCAN SLIDE - Normal   RAINBOW DRAW    Narrative:     The following orders were created for panel order [place] Draw.  Procedure                               Abnormality         Status                     ---------                               -----------         ------                     Light Blue Top[[id number]]                                    Final result               Green Top (Gel)[[id number]]                                   Final result               Lavender Top[[id number]]                                      Final result               Gold Top - SST[[id number]]                                    Final result                 Please view results for these tests on the individual orders.   SODIUM, URINE, RANDOM   OSMOLALITY, URINE   CBC AND DIFFERENTIAL    Narrative:     The following orders were created for panel order CBC & Differential.  Procedure                               Abnormality         Status                     ---------                               -----------         ------                     Scan Slide[[id number]]                    Normal              Final result               CBC Auto Differential[[id number]]         Abnormal            Final result                 Please view results for these tests on the individual orders.   LIGHT BLUE TOP   GREEN TOP   LAVENDER  TOP   [facility] TOP - [facility]       Xr Abdomen 2 View With Chest 1 View    Result Date: 4/2/2018  Narrative: EXAMINATION:  Acute abdominal series            VIEWS:  Chest:  1 ; Abdo:  2 DATE/TIME:     4/2/2018 2:59 PM CDT   INDICATION: vomiting , chest pain , COMPARISON EXAMINATION:    none                          FINDINGS:                         Chest:     heart size: within normal limits     mediastinal contour: within normal limits     lungs: no evidence of focal air space disease, grossly negative     pleura:  no pleural fluid     osseous: limited, grossly negative for age.     misc: Abdomen:     bowel gas pattern: nonobstructive     free air:  none visualized     calculi:   none visualized     osseous:  limited, grossly negative for age.     misc:       Impression: CONCLUSION:    1. Negative examination.                            If pain or symptoms persist beyond reasonable expectations, a contrast enhanced CT examination is suggested, as is deemed clinical appropriate.               Electronically signed by:  JOSE Cunha MD  4/2/2018 3:00 PM CDT Workstation: [id number]          Final diagnoses:   Hyponatremia   Hypokalemia   General weakness   Chest pain, unspecified type            Lee Pimentel MD  04/02/18 1652      Electronically signed by Lee Pimentel MD at 4/2/2018  4:52 PM     Vera Watson RN at 4/2/2018  4:06 PM        3% saline paused per Dr. Pimentel until urine osmolality is collected. Will restart after collection of urine.      Vera Watson RN  04/02/18 1607      Electronically signed by Vera Watson RN at 4/2/2018  4:07 PM       Hospital Medications (all)       Dose Frequency Start End    acetaminophen (TYLENOL) tablet 650 mg 650 mg Every 4 Hours PRN 4/2/2018     Sig - Route: Take 2 tablets by mouth Every 4 (Four) Hours As Needed for Headache or Fever (temperature greater than 101.5 F). - Oral    Cosign for Ordering: Accepted by Shay Brewster MD on 4/2/2018  7:06 PM     hydrALAZINE (APRESOLINE) injection 20 mg 20 mg Every 4 Hours PRN 4/2/2018     Sig - Route: Infuse 1 mL into a venous catheter Every 4 (Four) Hours As Needed for High Blood Pressure. - Intravenous    Cosign for Ordering: Accepted by Shay Brewster MD on 4/2/2018  7:06 PM    lisinopril (PRINIVIL,ZESTRIL) tablet 10 mg 10 mg Every 24 Hours Scheduled 4/3/2018     Sig - Route: Take 1 tablet by mouth Daily. - Oral    ondansetron (ZOFRAN) tablet 4 mg 4 mg Every 6 Hours PRN 4/2/2018     Sig - Route: Take 1 tablet by mouth Every 6 (Six) Hours As Needed for Nausea or Vomiting. - Oral    Cosign for Ordering: Accepted by Shay Brewster MD on 4/2/2018  7:06 PM    pantoprazole (PROTONIX) EC tablet 40 mg 40 mg 2 Times Daily Before Meals 4/2/2018     Sig - Route: Take 1 tablet by mouth 2 (Two) Times a Day Before Meals. - Oral    Cosign for Ordering: Accepted by Shay Brewster MD on 4/2/2018  7:06 PM    potassium chloride (MICRO-K) CR capsule 40 mEq 40 mEq Once 4/2/2018 4/2/2018    Sig - Route: Take 4 capsules by mouth 1 (One) Time. - Oral    potassium chloride (MICRO-K) CR capsule 40 mEq 40 mEq Once 4/3/2018     Sig - Route: Take 4 capsules by mouth 1 (One) Time. - Oral    Cosign for Ordering: Required by Shay Brewster MD    potassium chloride 10 mEq in 100 mL IVPB 10 mEq Every 1 Hour 4/2/2018 4/2/2018    Sig - Route: Infuse 100 mL into a venous catheter Every 1 (One) Hour. - Intravenous    potassium chloride 10 mEq in 100 mL IVPB 10 mEq Every 1 Hour 4/3/2018 4/3/2018    Sig - Route: Infuse 100 mL into a venous catheter Every 1 (One) Hour. - Intravenous    sodium chloride (HYPERTONIC) 3 % infusion 20 mL/hr Once 4/2/2018 4/2/2018    Sig - Route: Infuse 20 mL/hr into a venous catheter 1 (One) Time. - Intravenous    sodium chloride 0.9 % bolus 1,000 mL 1,000 mL Once 4/2/2018 4/2/2018    Sig - Route: Infuse 1,000 mL into a venous catheter 1 (One) Time. - Intravenous    sodium chloride 0.9 % flush 1-10 mL 1-10 mL As Needed 4/2/2018      Sig - Route: Infuse 1-10 mL into a venous catheter As Needed for Line Care. - Intravenous    Cosign for Ordering: Accepted by Shay Brewster MD on 4/2/2018  7:06 PM    sodium chloride 0.9 % infusion 70 mL/hr Continuous 4/2/2018     Sig - Route: Infuse 70 mL/hr into a venous catheter Continuous. - Intravenous    tamsulosin (FLOMAX) 24 hr capsule 0.4 mg 0.4 mg Every 24 Hours 4/3/2018     Sig - Route: Take 1 capsule by mouth Daily. - Oral    Cosign for Ordering: Accepted by Shay Brewster MD on 4/2/2018  7:06 PM    lisinopril (PRINIVIL,ZESTRIL) 10 mg, hydrochlorothiazide (MICROZIDE) 12.5 mg for ZESTORTIC 10-12.5 (Discontinued)  Every 24 Hours Scheduled 4/3/2018 4/3/2018    Sig - Route: Take  by mouth Daily. - Oral    Cosign for Ordering: Accepted by Shay Brewster MD on 4/2/2018  7:06 PM            Lab Results (last 24 hours)     Procedure Component Value Units Date/Time    TSH [[id number]]  (Normal) Collected:  04/03/18 0822    Specimen:  Blood Updated:  04/03/18 0950     TSH 0.700 mIU/mL     Comprehensive Metabolic Panel [[id number]]  (Abnormal) Collected:  04/03/18 0822    Specimen:  Blood Updated:  04/03/18 0925     Glucose 73 mg/dL      BUN 8 mg/dL      Creatinine 0.57 (L) mg/dL      Sodium 120 (C) mmol/L      Potassium 3.4 (L) mmol/L      Chloride 84 (L) mmol/L      CO2 23.0 mmol/L      Calcium 8.4 mg/dL      Total Protein 5.9 (L) g/dL      Albumin 3.50 g/dL      ALT (SGPT) 136 (H) U/L      AST (SGOT) 85 (H) U/L      Alkaline Phosphatase 54 U/L      Total Bilirubin 1.0 mg/dL      eGFR Non African Amer 145 (H) mL/min/1.73      eGFR  African Amer 176 (H) mL/min/1.73      Globulin 2.4 gm/dL      A/G Ratio 1.5 g/dL      BUN/Creatinine Ratio 14.0     Anion Gap 13.0 mmol/L     Magnesium [[id number]]  (Normal) Collected:  04/03/18 0822    Specimen:  Blood Updated:  04/03/18 0921     Magnesium 2.0 mg/dL     Phosphorus [[id number]]  (Normal) Collected:  04/03/18 0822    Specimen:  Blood Updated:  04/03/18 0921      Phosphorus 2.6 mg/dL     CBC & Differential [[id number]] Collected:  04/03/18 0822    Specimen:  Blood Updated:  04/03/18 0912    Narrative:       The following orders were created for panel order CBC & Differential.  Procedure                               Abnormality         Status                     ---------                               -----------         ------                     Scan Slide[[id number]]                                                                  CBC Auto Differential[[id number]]        Abnormal            Final result                 Please view results for these tests on the individual orders.    CBC Auto Differential [[id number]]  (Abnormal) Collected:  04/03/18 0822    Specimen:  Blood Updated:  04/03/18 0912     WBC 6.00 10*3/mm3      RBC 3.72 (L) 10*6/mm3      Hemoglobin 13.3 (L) g/dL      Hematocrit 34.4 (L) %      MCV 92.5 fL      MCH 35.8 (H) pg      MCHC 38.7 (H) g/dL      RDW 11.4 (L) %      RDW-SD 38.7 fl      MPV 10.0 fL      Platelets 195 10*3/mm3      Neutrophil % 62.4 %      Lymphocyte % 22.7 %      Monocyte % 12.7 (H) %      Eosinophil % 0.7 %      Basophil % 0.8 %      Immature Grans % 0.7 (H) %      Neutrophils, Absolute 3.75 10*3/mm3      Lymphocytes, Absolute 1.36 10*3/mm3      Monocytes, Absolute 0.76 10*3/mm3      Eosinophils, Absolute 0.04 10*3/mm3      Basophils, Absolute 0.05 10*3/mm3      Immature Grans, Absolute 0.04 (H) 10*3/mm3     Cortisol [[id number]]  (Normal) Collected:  04/02/18 1804    Specimen:  Blood Updated:  04/03/18 0254     Cortisol 8.70 mcg/dL     Sodium [[id number]]  (Abnormal) Collected:  04/02/18 2123    Specimen:  Blood Updated:  04/02/18 2143     Sodium 117 (C) mmol/L     Potassium [[id number]]  (Abnormal) Collected:  04/02/18 2123    Specimen:  Blood Updated:  04/02/18 2142     Potassium 3.1 (L) mmol/L     Sodium [[id number]]  (Abnormal) Collected:  04/02/18 1804    Specimen:  Blood Updated:  04/02/18 1835     Sodium 115 (C) mmol/L     Uric Acid  [[id number]]  (Normal) Collected:  04/02/18 1804    Specimen:  Blood Updated:  04/02/18 1830     Uric Acid 3.2 mg/dL     Osmolality, Urine - Urine, Clean Catch [[id number]]  (Normal) Collected:  04/02/18 1639    Specimen:  Urine from Urine, Clean Catch Updated:  04/02/18 1704     Osmolality, Urine 223 mOsm/kg     Sodium, Urine, Random - Urine, Clean Catch [[id number]]  (Normal) Collected:  04/02/18 1639    Specimen:  Urine from Urine, Clean Catch Updated:  04/02/18 1656     Sodium, Urine 44 mmol/L     Urinalysis With / Culture If Indicated - Urine, Clean Catch [[id number]]  (Abnormal) Collected:  04/02/18 1639    Specimen:  Urine from Urine, Clean Catch Updated:  04/02/18 1643     Color, UA Yellow     Appearance, UA Clear     pH, UA 7.5     Specific Gravity, UA 1.007     Glucose,  mg/dL (Trace) (A)     Ketones, UA Trace (A)     Bilirubin, UA Negative     Blood, UA Negative     Protein, UA Negative     Leuk Esterase, UA Negative     Nitrite, UA Negative     Urobilinogen, UA 1.0 E.U./dL    Narrative:       Urine microscopic not indicated.    CBC & Differential [[id number]] Collected:  04/02/18 1431    Specimen:  Blood Updated:  04/02/18 1608    Narrative:       The following orders were created for panel order CBC & Differential.  Procedure                               Abnormality         Status                     ---------                               -----------         ------                     Scan Slide[[id number]]                    Normal              Final result               CBC Auto Differential[[id number]]         Abnormal            Final result                 Please view results for these tests on the individual orders.    Scan Slide [[id number]]  (Normal) Collected:  04/02/18 1431    Specimen:  Blood Updated:  04/02/18 1608     RBC Morphology Normal     WBC Morphology Normal     Platelet Morphology Normal    [place] Draw [[id number]] Collected:  04/02/18 1431    Specimen:  Blood Updated:  04/02/18 1546     Narrative:       The following orders were created for panel order [place] Draw.  Procedure                               Abnormality         Status                     ---------                               -----------         ------                     Light Blue Top[[id number]]                                    Final result               Green Top (Gel)[[id number]]                                   Final result               Lavender Top[[id number]]                                      Final result               Gold Top - SST[[id number]]                                    Final result                 Please view results for these tests on the individual orders.    Light Blue Top [[id number]] Collected:  04/02/18 1431    Specimen:  Blood Updated:  04/02/18 1546     Extra Tube hold for add-on     Comment: Auto resulted       Green Top (Gel) [[id number]] Collected:  04/02/18 1431    Specimen:  Blood Updated:  04/02/18 1546     Extra Tube Hold for add-ons.     Comment: Auto resulted.       Lavender Top [[id number]] Collected:  04/02/18 1431    Specimen:  Blood Updated:  04/02/18 1546     Extra Tube hold for add-on     Comment: Auto resulted       Gold Top - SST [[id number]] Collected:  04/02/18 1431    Specimen:  Blood Updated:  04/02/18 1546     Extra Tube Hold for add-ons.     Comment: Auto resulted.       BNP [[id number]]  (Normal) Collected:  04/02/18 1431    Specimen:  Blood Updated:  04/02/18 1519     proBNP 361.0 pg/mL     Troponin [[id number]]  (Normal) Collected:  04/02/18 1431    Specimen:  Blood Updated:  04/02/18 1519     Troponin I <0.012 ng/mL     Comprehensive Metabolic Panel [[id number]]  (Abnormal) Collected:  04/02/18 1431    Specimen:  Blood Updated:  04/02/18 1518     Glucose 102 (H) mg/dL      BUN 8 mg/dL      Creatinine 0.60 (L) mg/dL      Sodium 114 (C) mmol/L      Comment: RERAN TO CONFIRM RESULT        Potassium 2.3 (C) mmol/L      Comment: RERAN TO CONFIRM RESULT        Chloride 72 (L) mmol/L      CO2 29.0  mmol/L      Calcium 8.8 mg/dL      Total Protein 6.8 g/dL      Albumin 4.20 g/dL      ALT (SGPT) 152 (H) U/L      AST (SGOT) 97 (H) U/L      Alkaline Phosphatase 65 U/L      Total Bilirubin 1.4 (H) mg/dL      eGFR Non African Amer 137 (H) mL/min/1.73      eGFR  African Amer 165 (H) mL/min/1.73      Globulin 2.6 gm/dL      A/G Ratio 1.6 g/dL      BUN/Creatinine Ratio 13.3     Anion Gap 13.0 mmol/L     Protime-INR [[id number]]  (Normal) Collected:  04/02/18 1431    Specimen:  Blood Updated:  04/02/18 1516     Protime 11.9 Seconds      INR 0.89    Narrative:       Therapeutic range for most indications is 2.0-3.0 INR,  or 2.5-3.5 for mechanical heart valves.    aPTT [[id number]]  (Normal) Collected:  04/02/18 1431    Specimen:  Blood Updated:  04/02/18 1516     PTT 25.2 seconds     Narrative:       The recommended Heparin therapeutic range is 68-97 seconds.    Lipase [[id number]]  (Normal) Collected:  04/02/18 1431    Specimen:  Blood Updated:  04/02/18 1509     Lipase 114 U/L     CBC Auto Differential [[id number]]  (Abnormal) Collected:  04/02/18 1431    Specimen:  Blood Updated:  04/02/18 1458     WBC 6.18 10*3/mm3      RBC 4.12 (L) 10*6/mm3      Hemoglobin 14.8 g/dL      Hematocrit 37.4 (L) %      MCV 90.8 fL      MCH 35.9 (H) pg      MCHC 39.6 (H) g/dL      RDW 11.4 (L) %      RDW-SD 37.6 fl      MPV 9.8 fL      Platelets 197 10*3/mm3      Neutrophil % 67.0 %      Lymphocyte % 19.4 %      Monocyte % 12.3 (H) %      Eosinophil % 0.5 %      Basophil % 0.3 %      Immature Grans % 0.5 %      Neutrophils, Absolute 4.14 10*3/mm3      Lymphocytes, Absolute 1.20 10*3/mm3      Monocytes, Absolute 0.76 10*3/mm3      Eosinophils, Absolute 0.03 10*3/mm3      Basophils, Absolute 0.02 10*3/mm3      Immature Grans, Absolute 0.03 (H) 10*3/mm3         Imaging Results (last 24 hours)     Procedure Component Value Units Date/Time    XR Chest 1 View [[id number]] Collected:  04/03/18 0424     Updated:  04/03/18 0812    Narrative:          PROCEDURE: Single chest view AP    REASON FOR EXAM:hyponatremia smoker wt loss rule out mass, E87.1  Hypo-osmolality and hyponatremia E87.6 Hypokalemia R53.1 Weakness  R07.9 Chest pain, unspecified    FINDINGS: Comparison exam dated April 2, 2018. Cardiac and  pulmonary vasculature are normal. Lungs are clear. Pleural spaces  are normal. No acute osseous abnormality.      Impression:       Negative single view chest    Electronically signed by:  Uriah Howe MD  4/3/2018 8:11 AM CDT  Workstation: [id number]    XR Abdomen 2 View With Chest 1 View [[id number]] Collected:  04/02/18 1436     Updated:  04/02/18 1501    Narrative:         EXAMINATION:  Acute abdominal series              VIEWS:  Chest:  1 ; Abdo:  2   DATE/TIME:     4/2/2018 2:59 PM CDT      INDICATION: vomiting , chest pain ,  COMPARISON EXAMINATION:    none                              FINDINGS:                             Chest:      heart size: within normal limits      mediastinal contour: within normal limits       lungs: no evidence of focal air space disease, grossly  negative      pleura:  no pleural fluid      osseous: limited, grossly negative for age.      misc:    Abdomen:      bowel gas pattern: nonobstructive      free air:  none visualized      calculi:   none visualized      osseous:  limited, grossly negative for age.      misc:        Impression:       CONCLUSION:        1. Negative examination.                                If pain or symptoms persist beyond reasonable expectations, a  contrast enhanced CT examination is suggested, as is deemed  clinical appropriate.                       Electronically signed by:  JOSE Cunha MD  4/2/2018 3:00 PM  CDT Workstation: [id number]        ECG/EMG Results (last 24 hours)     Procedure Component Value Units Date/Time    ECG 12 Lead [[id number]]  (Abnormal) Resulted:  04/02/18 1652     Updated:  04/02/18 1652    ECG 12 Lead [[id number]] Collected:  04/02/18 1411     Updated:  04/03/18 0955     "Narrative:       Test Reason : chest pain  Blood Pressure : **/** mmHG  Vent. Rate : 085 BPM     Atrial Rate : 085 BPM     P-R Int : 168 ms          QRS Dur : 150 ms      QT Int : 462 ms       P-R-T Axes : 043 038 038 degrees     QTc Int : 549 ms    Normal sinus rhythm  Right bundle branch block  Abnormal ECG      Referred By:             Confirmed By:              Physician Progress Notes (last 24 hours) (Notes from 2018 10:31 AM through 4/3/2018 10:31 AM)      Gilma Britt MD at 4/3/2018  8:19 AM          [facility]  [address] [place] [address]. [address]  T  [phone]  F  [phone]     Progress Note          PATIENT  DEMOGRAPHICS   PATIENT NAME: Barron Ackerman                      PHYSICIAN: Gilma Britt MD  : 1955  MRN: [id number]   LOS: 1 day    Patient Care Team:  ERNST Celestin as PCP - General  Subjective   SUBJECTIVE   Feels well no soa       Objective   OBJECTIVE   Vital Signs  Temp:  [98.5 °F (36.9 °C)-98.8 °F (37.1 °C)] 98.7 °F (37.1 °C)  Heart Rate:  [66-87] 66  Resp:  [16-18] 18  BP: (101-136)/(61-85) 133/77    Flowsheet Rows    Flowsheet Row First Filed Value   Admission Height 177.8 cm (70\") Documented at 2018 1423   Admission Weight 75.8 kg (167 lb) Documented at 2018 1423           I/O last 3 completed shifts:  In: 1668 [P.O.:530; I.V.:738; IV Piggyback:400]  Out: 550 [Urine:550]    PHYSICAL EXAM    Physical Exam   Constitutional: He is oriented to person, place, and time. He appears well-developed.   HENT:   Head: Normocephalic.   Eyes: Pupils are equal, round, and reactive to light.   Cardiovascular: Normal rate, regular rhythm and normal heart sounds.    Pulmonary/Chest: Effort normal and breath sounds normal.   Abdominal: Soft. Bowel sounds are normal.   Neurological: He is alert and oriented to person, place, and time.       RESULTS   Results Review:      Results from last 7 days  Lab Units 1818  1804 " 04/02/18  1431   SODIUM mmol/L 117* 115* 114*   POTASSIUM mmol/L 3.1*  --  2.3*   CHLORIDE mmol/L  --   --  72*   CO2 mmol/L  --   --  29.0   BUN mg/dL  --   --  8   CREATININE mg/dL  --   --  0.60*   CALCIUM mg/dL  --   --  8.8   BILIRUBIN mg/dL  --   --  1.4*   ALK PHOS U/L  --   --  65   ALT (SGPT) U/L  --   --  152*   AST (SGOT) U/L  --   --  97*   GLUCOSE mg/dL  --   --  102*       Estimated Creatinine Clearance: 131.6 mL/min (by C-G formula based on SCr of 0.6 mg/dL (L)).            Results from last 7 days  Lab Units 04/02/18  1804   URIC ACID mg/dL 3.2         Results from last 7 days  Lab Units 04/02/18  1431   WBC 10*3/mm3 6.18   HEMOGLOBIN g/dL 14.8   PLATELETS 10*3/mm3 197         Results from last 7 days  Lab Units 04/02/18  1431   INR  0.89         Imaging Results (last 24 hours)     Procedure Component Value Units Date/Time    XR Chest 1 View [[id number]] Collected:  04/03/18 0424     Updated:  04/03/18 0812    Narrative:         PROCEDURE: Single chest view AP    REASON FOR EXAM:hyponatremia smoker wt loss rule out mass, E87.1  Hypo-osmolality and hyponatremia E87.6 Hypokalemia R53.1 Weakness  R07.9 Chest pain, unspecified    FINDINGS: Comparison exam dated April 2, 2018. Cardiac and  pulmonary vasculature are normal. Lungs are clear. Pleural spaces  are normal. No acute osseous abnormality.      Impression:       Negative single view chest    Electronically signed by:  Uriah Howe MD  4/3/2018 8:11 AM CDT  Workstation: [id number]    XR Abdomen 2 View With Chest 1 View [[id number]] Collected:  04/02/18 1436     Updated:  04/02/18 1501    Narrative:         EXAMINATION:  Acute abdominal series              VIEWS:  Chest:  1 ; Abdo:  2   DATE/TIME:     4/2/2018 2:59 PM CDT      INDICATION: vomiting , chest pain ,  COMPARISON EXAMINATION:    none                              FINDINGS:                             Chest:      heart size: within normal limits      mediastinal contour: within normal limits        lungs: no evidence of focal air space disease, grossly  negative      pleura:  no pleural fluid      osseous: limited, grossly negative for age.      misc:    Abdomen:      bowel gas pattern: nonobstructive      free air:  none visualized      calculi:   none visualized      osseous:  limited, grossly negative for age.      misc:        Impression:       CONCLUSION:        1. Negative examination.                                If pain or symptoms persist beyond reasonable expectations, a  contrast enhanced CT examination is suggested, as is deemed  clinical appropriate.                       Electronically signed by:  JOSE Cunha MD  4/2/2018 3:00 PM  CDT Workstation: [id number]           MEDICATIONS      lisinopril 10 mg Oral Q24H   pantoprazole 40 mg Oral BID AC   tamsulosin 0.4 mg Oral Q24H       sodium chloride 70 mL/hr Last Rate: 70 mL/hr (04/02/18 2117)       Assessment/Plan   ASSESSMENT / PLAN    Principal Problem:    Hyponatremia  Active Problems:    Benign prostatic hyperplasia with lower urinary tract symptoms    Gastroesophageal reflux disease    Hypertension    Hypokalemia    1.hyponatremia.  Patient urine osmolality is not extremely high and appears lower than serum osmolality.  I believe he is hypovolemic in the setting of recent illness vomiting diarrhea and also on superimposed HCTZ in the background lead to more hypovolemic picture.  He also has weight loss and prior history of smoking.     cxr is negative. Cortisol is normal. tsh is pending. Keep normal saline at 70cc/hr and will review todays lab. Check lab q 4-6 hr today. Will correct the sodium 8-10 MEQ point per day.     2.history of hypertension keep  lisinopril and hctz is on hold    3. Hypokalemia - replaced last night. Awaiting am lab.                    This document has been electronically signed by Gilma Britt MD on April 3, 2018 8:20 AM           Electronically signed by Gilma Britt MD at 4/3/2018  8:26 AM     Janusz Patrick  MD at 4/3/2018  6:30 AM     Attestation signed by Kennedy Clay DO at 4/3/2018  9:06 AM    I have reviewed the documentation above and agree.    I have examined the patient and reviewed the pertinent diagnostic data. I have discussed the case with the Family Medicine Resident and agree with the assessment and plan of care.    Kennedy Clay DO           This document has been electronically signed by Kennedy Clay DO on April 3, 2018 9:06 AM                          FAMILY MEDICINE DAILY PROGRESS NOTE    NAME: Barron Ackerman  : 1955  MRN: [id number]      LOS: 1 day     PROVIDER OF SERVICE: Janusz Patrick MD    Chief Complaint: Hyponatremia    Subjective:     Interval History:  History taken from: patient chart RN  Patient Complaints: fatigue    No history of events last night.  Patient states she is feeling better compared to yesterday.  When asked to elaborate patient explains he is feeling less dizzy, lightheaded, and fatigued.  No nausea, vomiting, diarrhea.  He has no other concerns/complaints that he would like to discuss smoking.    Review of Systems:   Review of Systems   Constitutional: Positive for fatigue. Negative for activity change, appetite change, chills, diaphoresis and fever.   HENT: Positive for congestion. Negative for ear pain, rhinorrhea, sneezing and sore throat.    Eyes: Negative for pain, redness, itching and visual disturbance.   Respiratory: Positive for cough. Negative for choking, chest tightness, shortness of breath, wheezing and stridor.    Cardiovascular: Negative for chest pain, palpitations and leg swelling.   Gastrointestinal: Negative for abdominal distention, abdominal pain, blood in stool, constipation, diarrhea, nausea, rectal pain and vomiting.   Genitourinary: Negative for difficulty urinating, dysuria, flank pain and frequency.   Skin: Negative for color change and rash.   Neurological: Positive for weakness. Negative for dizziness, seizures, syncope,  light-headedness, numbness and headaches.   Psychiatric/Behavioral: Negative for agitation, confusion, decreased concentration and sleep disturbance. The patient is not nervous/anxious.    All other systems reviewed and are negative.      Objective:     Vital Signs  Temp:  [98.5 °F (36.9 °C)-98.8 °F (37.1 °C)] 98.7 °F (37.1 °C)  Heart Rate:  [66-87] 66  Resp:  [16-18] 18  BP: (101-136)/(61-85) 133/77  Body mass index is 23.06 kg/m².    Physical Exam  Physical Exam   Constitutional: He is oriented to person, place, and time. He appears well-developed and well-nourished.  Non-toxic appearance. He does not have a sickly appearance. He does not appear ill. No distress.   HENT:   Head: Normocephalic and atraumatic.   Right Ear: External ear normal. No lacerations. No swelling or tenderness.   Left Ear: External ear normal. No lacerations. No swelling or tenderness.   Nose: Nose normal.   Mouth/Throat: Uvula is midline, oropharynx is clear and moist and mucous membranes are normal.   Eyes: Conjunctivae, EOM and lids are normal. Pupils are equal, round, and reactive to light. No scleral icterus.   Neck: No tracheal deviation present. No thyromegaly present.   Cardiovascular: Normal rate, regular rhythm, normal heart sounds and intact distal pulses.  Exam reveals no gallop, no distant heart sounds and no friction rub.    No murmur heard.  Pulses:       Carotid pulses are 2+ on the right side, and 2+ on the left side.       Radial pulses are 2+ on the right side, and 2+ on the left side.        Dorsalis pedis pulses are 2+ on the right side, and 2+ on the left side.        Posterior tibial pulses are 2+ on the right side, and 2+ on the left side.   Pulmonary/Chest: Effort normal. No accessory muscle usage or stridor. No respiratory distress. He has no decreased breath sounds. He has no wheezes. He has rhonchi in the left lower field. He has no rales. He exhibits no tenderness.   Abdominal: Soft. Bowel sounds are normal. He  exhibits no shifting dullness, no distension and no ascites. There is no tenderness. There is no rigidity, no rebound and no guarding.   Musculoskeletal:        Right lower leg: He exhibits no swelling.        Left lower leg: He exhibits no swelling.        Right foot: There is no swelling.        Left foot: There is no swelling.     Vascular Status -  His right foot exhibits normal foot vasculature  and no edema. His left foot exhibits normal foot vasculature  and no edema.  Lymphadenopathy:     He has no cervical adenopathy.   Neurological: He is alert and oriented to person, place, and time. He has normal strength. No cranial nerve deficit or sensory deficit. He exhibits normal muscle tone. GCS eye subscore is 4. GCS verbal subscore is 5. GCS motor subscore is 6.   Skin: Skin is warm and dry. No rash noted. He is not diaphoretic. No erythema. No pallor.   Psychiatric: He has a normal mood and affect. His speech is normal.   Nursing note and vitals reviewed.      Medication Review    Current Facility-Administered Medications:   •  acetaminophen (TYLENOL) tablet 650 mg, 650 mg, Oral, Q4H PRN, Janusz Patrick MD, 650 mg at 04/03/18 0313  •  hydrALAZINE (APRESOLINE) injection 20 mg, 20 mg, Intravenous, Q4H PRN, Janusz Patrick MD  •  lisinopril (PRINIVIL,ZESTRIL) tablet 10 mg, 10 mg, Oral, Q24H, Gilma Britt MD  •  ondansetron (ZOFRAN) tablet 4 mg, 4 mg, Oral, Q6H PRN, Janusz Patrick MD  •  pantoprazole (PROTONIX) EC tablet 40 mg, 40 mg, Oral, BID AC, Janusz Patrick MD, 40 mg at 04/03/18 0631  •  sodium chloride 0.9 % flush 1-10 mL, 1-10 mL, Intravenous, PRN, Janusz Patrick MD  •  sodium chloride 0.9 % infusion, 70 mL/hr, Intravenous, Continuous, Gilma Britt MD, Last Rate: 70 mL/hr at 04/02/18 2117, 70 mL/hr at 04/02/18 2117  •  tamsulosin (FLOMAX) 24 hr capsule 0.4 mg, 0.4 mg, Oral, Q24H, Janusz Patrick MD     Diagnostic Data    Lab Results (last 24 hours)     Procedure Component Value Units Date/Time     Cortisol [[id number]]  (Normal) Collected:  04/02/18 1804    Specimen:  Blood Updated:  04/03/18 0254     Cortisol 8.70 mcg/dL     Sodium [[id number]]  (Abnormal) Collected:  04/02/18 2123    Specimen:  Blood Updated:  04/02/18 2143     Sodium 117 (C) mmol/L     Potassium [[id number]]  (Abnormal) Collected:  04/02/18 2123    Specimen:  Blood Updated:  04/02/18 2142     Potassium 3.1 (L) mmol/L     Sodium [[id number]]  (Abnormal) Collected:  04/02/18 1804    Specimen:  Blood Updated:  04/02/18 1835     Sodium 115 (C) mmol/L     Uric Acid [[id number]]  (Normal) Collected:  04/02/18 1804    Specimen:  Blood Updated:  04/02/18 1830     Uric Acid 3.2 mg/dL     Osmolality, Urine - Urine, Clean Catch [[id number]]  (Normal) Collected:  04/02/18 1639    Specimen:  Urine from Urine, Clean Catch Updated:  04/02/18 1704     Osmolality, Urine 223 mOsm/kg     Sodium, Urine, Random - Urine, Clean Catch [[id number]]  (Normal) Collected:  04/02/18 1639    Specimen:  Urine from Urine, Clean Catch Updated:  04/02/18 1656     Sodium, Urine 44 mmol/L     Urinalysis With / Culture If Indicated - Urine, Clean Catch [[id number]]  (Abnormal) Collected:  04/02/18 1639    Specimen:  Urine from Urine, Clean Catch Updated:  04/02/18 1643     Color, UA Yellow     Appearance, UA Clear     pH, UA 7.5     Specific Gravity, UA 1.007     Glucose,  mg/dL (Trace) (A)     Ketones, UA Trace (A)     Bilirubin, UA Negative     Blood, UA Negative     Protein, UA Negative     Leuk Esterase, UA Negative     Nitrite, UA Negative     Urobilinogen, UA 1.0 E.U./dL    Narrative:       Urine microscopic not indicated.    CBC & Differential [[id number]] Collected:  04/02/18 1431    Specimen:  Blood Updated:  04/02/18 1608    Narrative:       The following orders were created for panel order CBC & Differential.  Procedure                               Abnormality         Status                     ---------                               -----------         ------                      Scan Slide[[id number]]                    Normal              Final result               CBC Auto Differential[[id number]]         Abnormal            Final result                 Please view results for these tests on the individual orders.    Scan Slide [[id number]]  (Normal) Collected:  04/02/18 1431    Specimen:  Blood Updated:  04/02/18 1608     RBC Morphology Normal     WBC Morphology Normal     Platelet Morphology Normal    [place] Draw [[id number]] Collected:  04/02/18 1431    Specimen:  Blood Updated:  04/02/18 1546    Narrative:       The following orders were created for panel order [place] Draw.  Procedure                               Abnormality         Status                     ---------                               -----------         ------                     Light Blue Top[[id number]]                                    Final result               Green Top (Gel)[[id number]]                                   Final result               Lavender Top[[id number]]                                      Final result               Gold Top - SST[[id number]]                                    Final result                 Please view results for these tests on the individual orders.    Light Blue Top [[id number]] Collected:  04/02/18 1431    Specimen:  Blood Updated:  04/02/18 1546     Extra Tube hold for add-on     Comment: Auto resulted       Green Top (Gel) [[id number]] Collected:  04/02/18 1431    Specimen:  Blood Updated:  04/02/18 1546     Extra Tube Hold for add-ons.     Comment: Auto resulted.       Lavender Top [[id number]] Collected:  04/02/18 1431    Specimen:  Blood Updated:  04/02/18 1546     Extra Tube hold for add-on     Comment: Auto resulted       Gold Top - SST [[id number]] Collected:  04/02/18 1431    Specimen:  Blood Updated:  04/02/18 1546     Extra Tube Hold for add-ons.     Comment: Auto resulted.       BNP [[id number]]  (Normal) Collected:  04/02/18 1431    Specimen:  Blood Updated:  04/02/18 1519      proBNP 361.0 pg/mL     Troponin [[id number]]  (Normal) Collected:  04/02/18 1431    Specimen:  Blood Updated:  04/02/18 1519     Troponin I <0.012 ng/mL     Comprehensive Metabolic Panel [[id number]]  (Abnormal) Collected:  04/02/18 1431    Specimen:  Blood Updated:  04/02/18 1518     Glucose 102 (H) mg/dL      BUN 8 mg/dL      Creatinine 0.60 (L) mg/dL      Sodium 114 (C) mmol/L      Comment: RERAN TO CONFIRM RESULT        Potassium 2.3 (C) mmol/L      Comment: RERAN TO CONFIRM RESULT        Chloride 72 (L) mmol/L      CO2 29.0 mmol/L      Calcium 8.8 mg/dL      Total Protein 6.8 g/dL      Albumin 4.20 g/dL      ALT (SGPT) 152 (H) U/L      AST (SGOT) 97 (H) U/L      Alkaline Phosphatase 65 U/L      Total Bilirubin 1.4 (H) mg/dL      eGFR Non African Amer 137 (H) mL/min/1.73      eGFR  African Amer 165 (H) mL/min/1.73      Globulin 2.6 gm/dL      A/G Ratio 1.6 g/dL      BUN/Creatinine Ratio 13.3     Anion Gap 13.0 mmol/L     Protime-INR [[id number]]  (Normal) Collected:  04/02/18 1431    Specimen:  Blood Updated:  04/02/18 1516     Protime 11.9 Seconds      INR 0.89    Narrative:       Therapeutic range for most indications is 2.0-3.0 INR,  or 2.5-3.5 for mechanical heart valves.    aPTT [[id number]]  (Normal) Collected:  04/02/18 1431    Specimen:  Blood Updated:  04/02/18 1516     PTT 25.2 seconds     Narrative:       The recommended Heparin therapeutic range is 68-97 seconds.    Lipase [[id number]]  (Normal) Collected:  04/02/18 1431    Specimen:  Blood Updated:  04/02/18 1509     Lipase 114 U/L     CBC Auto Differential [[id number]]  (Abnormal) Collected:  04/02/18 1431    Specimen:  Blood Updated:  04/02/18 1458     WBC 6.18 10*3/mm3      RBC 4.12 (L) 10*6/mm3      Hemoglobin 14.8 g/dL      Hematocrit 37.4 (L) %      MCV 90.8 fL      MCH 35.9 (H) pg      MCHC 39.6 (H) g/dL      RDW 11.4 (L) %      RDW-SD 37.6 fl      MPV 9.8 fL      Platelets 197 10*3/mm3      Neutrophil % 67.0 %      Lymphocyte % 19.4 %      Monocyte  % 12.3 (H) %      Eosinophil % 0.5 %      Basophil % 0.3 %      Immature Grans % 0.5 %      Neutrophils, Absolute 4.14 10*3/mm3      Lymphocytes, Absolute 1.20 10*3/mm3      Monocytes, Absolute 0.76 10*3/mm3      Eosinophils, Absolute 0.03 10*3/mm3      Basophils, Absolute 0.02 10*3/mm3      Immature Grans, Absolute 0.03 (H) 10*3/mm3         I reviewed the patient's new clinical results.    Assessment/Plan:     Active Hospital Problems (** Indicates Principal Problem)    Diagnosis Date Noted   • **Hyponatremia [E87.1] 04/02/2018     Priority: High     Na 114 today in the ER. 2 week hx of vomiting with no food intake.  -Nephrology consulted. Will correct the sodium 8-10 MG daily point per day.  -ER started 200cc bolus hypertonic saline.  -Urine Na, Osmolality, and creatinine pending.  -Uric acid and TSH and 8 AM cortisol pending.  -CBC and CMP pending.  -repeat K and Na q4-6hr per Nephrology.  -NS IVF 70cc/hr.     • Hypokalemia [E87.6] 04/02/2018     Priority: High     Potassium 2.3 in the ER on admission.  - EKG unremarkable.  - 40meq PO and 10 IV given in the ER.  -ICU on Tele.  -Nephrology consulted.  -CMP pending.     • Hypertension [I10] 04/02/2018     -Stable  -Continue home med of Prinzide.     • Gastroesophageal reflux disease [K21.9] 04/02/2018     Stable  -Continue home medication PPI.     • Benign prostatic hyperplasia with lower urinary tract symptoms [N40.1] 04/02/2018     -Continue Flomax from home.        Resolved Hospital Problems    Diagnosis Date Noted Date Resolved   No resolved problems to display.       DVT prophylaxis: SCDs/TEDs  Code status is Full Code    Plan for disposition:Where: home and When:  electrolytes correct and pt stable.        This document has been electronically signed by Janusz Patrick MD on April 3, 2018 8:42 AM    Electronically signed by Kennedy Clay DO at 4/3/2018  9:06 AM       Medical Student Notes (last 24 hours) (Notes from 4/2/2018 10:31 AM through 4/3/2018  10:31 AM)     No notes of this type exist for this encounter.           Consult Notes (last 24 hours) (Notes from 2018 10:31 AM through 4/3/2018 10:31 AM)      Gilma Britt MD at 2018  6:11 PM          [facility]  [address] [place] [address]. [id number]  T - [phone]  F - [phone]     Consultation         PATIENT  DEMOGRAPHICS   PATIENT NAME: Barron Ackerman                      PHYSICIAN: Gilma Britt MD  : 1955  MRN: [id number]    Subjective   SUBJECTIVE   Referring Provider: Dr Pimentel  Reason for Consultation: hyponatremia  History of present illness:     Mr. Ackerman is a 62 year gentleman with a past medical history of hypertension.  He is chronically on lisinopril hydrochlorothiazide and he is taking it for quite some time.  Patient came here with the 2 weeks history of generalized weakness along with the fatigue.  He also has poor oral intake.  He has been vomiting at least 1-2 times in a day along with the watery diarrhea at least one to twice a day.  He continues to drink at least 6 beers a day.  Patient also lost at least 12 pounds in the last few weeks.  He smokes at least a half a pack a day for nearly 40 years.    We have been asked to evaluate for sodium of 114.  Because of his symptoms we have suggested hypertonic saline but this is on hold until the urine studies are back.  Patient has received 200 cc of normal saline in the ER.    Past Medical History:   Diagnosis Date   • Hypertension      Past Surgical History:   Procedure Laterality Date   • HERNIA REPAIR       History reviewed. No pertinent family history.  Social History   Substance Use Topics   • Smoking status: Current Some Day Smoker   • Smokeless tobacco: Never Used   • Alcohol use Yes      Comment: occassional     Allergies:  Review of patient's allergies indicates no known allergies.     REVIEW OF SYSTEMS    Review of Systems   Constitutional: Positive for fatigue and unexpected weight  "change. Negative for chills and fever.   Respiratory: Negative for chest tightness and shortness of breath.    Cardiovascular: Negative for chest pain and leg swelling.   Gastrointestinal: Positive for diarrhea and nausea. Negative for abdominal pain.   Genitourinary: Negative for dysuria, flank pain and hematuria.   Neurological: Positive for weakness. Negative for dizziness and syncope.       Objective   OBJECTIVE   Vital Signs  Temp:  [98.5 °F (36.9 °C)] 98.5 °F (36.9 °C)  Heart Rate:  [80-87] 80  Resp:  [18] 18  BP: (101-132)/(61-80) 132/80    Flowsheet Rows    Flowsheet Row First Filed Value   Admission Height 177.8 cm (70\") Documented at 04/02/2018 1423   Admission Weight 75.8 kg (167 lb) Documented at 04/02/2018 1423           No intake/output data recorded.    PHYSICAL EXAM    Physical Exam   Constitutional: He is oriented to person, place, and time. He appears well-developed.   HENT:   Head: Normocephalic.   Eyes: Pupils are equal, round, and reactive to light.   Cardiovascular: Normal rate, regular rhythm and normal heart sounds.    Pulmonary/Chest: Effort normal and breath sounds normal.   Abdominal: Soft. Bowel sounds are normal.   Neurological: He is alert and oriented to person, place, and time.       RESULTS   Results Review:      Results from last 7 days  Lab Units 04/02/18  1431   SODIUM mmol/L 114*   POTASSIUM mmol/L 2.3*   CHLORIDE mmol/L 72*   CO2 mmol/L 29.0   BUN mg/dL 8   CREATININE mg/dL 0.60*   CALCIUM mg/dL 8.8   BILIRUBIN mg/dL 1.4*   ALK PHOS U/L 65   ALT (SGPT) U/L 152*   AST (SGOT) U/L 97*   GLUCOSE mg/dL 102*       Estimated Creatinine Clearance: 129.8 mL/min (by C-G formula based on SCr of 0.6 mg/dL (L)).                  Results from last 7 days  Lab Units 04/02/18  1431   WBC 10*3/mm3 6.18   HEMOGLOBIN g/dL 14.8   PLATELETS 10*3/mm3 197         Results from last 7 days  Lab Units 04/02/18  1431   INR  0.89        MEDICATIONS      potassium chloride 10 mEq Intravenous Q1H      "   Prescriptions Prior to Admission   Medication Sig Dispense Refill Last Dose   • esomeprazole (NEXIUM) 40 MG capsule Take 40 mg by mouth Daily.   4/2/2018 at 0900   • lisinopril-hydrochlorothiazide (PRINZIDE,ZESTORETIC) 10-12.5 MG per tablet Take 1 tablet by mouth Daily.   4/2/2018 at 0900   • amoxicillin (AMOXIL) 500 MG capsule Take 500 mg by mouth 2 (Two) Times a Day. For 10 days. Per pt/wife, started taking about 4 days ago. Pt has only been taking once daily.   Unknown at Unknown time     Assessment/Plan   ASSESSMENT / PLAN    Active Problems:    Hyponatremia    1.hyponatremia.  Patient urine osmolality is not extremely high and appears lower than serum osmolality.  I believe he is hypovolemic in the setting of recent illness vomiting diarrhea and also take superimposed HCTZ in the background lead to more hypovolemic picture.  He also has weight loss and prior history of smoking.    I agree with holding the 3% saline we'll check the sodium now and start him likely on normal saline.  I will get the chest x-ray in the morning.  I will add uric acid and TSH to finish the workup of hyponatremia.  We'll also check 8 AM cortisol.  Will correct the sodium 8-10 MEQ point per day.    2.history of hypertension patient lisinopril and hctz will be on hold    Thank you for the referral will continue follow the patient during his hospital stay        I discussed the patients findings and my recommendations with patient and family         This document has been electronically signed by Gilma Britt MD on April 2, 2018 6:11 PM             Electronically signed by Gilma Britt MD at 4/2/2018 10:21 PM

## 2018-04-04 PROBLEM — R13.19 OTHER DYSPHAGIA: Status: ACTIVE | Noted: 2018-04-04

## 2018-04-04 LAB
ALBUMIN SERPL-MCNC: 3 G/DL (ref 3.4–4.8)
ALBUMIN/GLOB SERPL: 1.3 G/DL (ref 1.1–1.8)
ALP SERPL-CCNC: 47 U/L (ref 38–126)
ALT SERPL W P-5'-P-CCNC: 95 U/L (ref 21–72)
ANION GAP SERPL CALCULATED.3IONS-SCNC: 6 MMOL/L (ref 5–15)
ANION GAP SERPL CALCULATED.3IONS-SCNC: 6 MMOL/L (ref 5–15)
AST SERPL-CCNC: 50 U/L (ref 17–59)
BASOPHILS # BLD AUTO: 0.03 10*3/MM3 (ref 0–0.2)
BASOPHILS NFR BLD AUTO: 0.5 % (ref 0–2)
BILIRUB SERPL-MCNC: 0.7 MG/DL (ref 0.2–1.3)
BUN BLD-MCNC: 4 MG/DL (ref 7–21)
BUN BLD-MCNC: 5 MG/DL (ref 7–21)
BUN/CREAT SERPL: 10.2 (ref 7–25)
BUN/CREAT SERPL: 7.7 (ref 7–25)
CALCIUM SPEC-SCNC: 8.2 MG/DL (ref 8.4–10.2)
CALCIUM SPEC-SCNC: 8.4 MG/DL (ref 8.4–10.2)
CHLORIDE SERPL-SCNC: 92 MMOL/L (ref 95–110)
CHLORIDE SERPL-SCNC: 97 MMOL/L (ref 95–110)
CO2 SERPL-SCNC: 24 MMOL/L (ref 22–31)
CO2 SERPL-SCNC: 24 MMOL/L (ref 22–31)
CREAT BLD-MCNC: 0.49 MG/DL (ref 0.7–1.3)
CREAT BLD-MCNC: 0.52 MG/DL (ref 0.7–1.3)
DEPRECATED RDW RBC AUTO: 39.1 FL (ref 35.1–43.9)
EOSINOPHIL # BLD AUTO: 0.07 10*3/MM3 (ref 0–0.7)
EOSINOPHIL NFR BLD AUTO: 1.2 % (ref 0–7)
ERYTHROCYTE [DISTWIDTH] IN BLOOD BY AUTOMATED COUNT: 11.5 % (ref 11.5–14.5)
GFR SERPL CREATININE-BSD FRML MDRD: 195 ML/MIN/1.73 (ref 49–113)
GFR SERPL CREATININE-BSD FRML MDRD: 209 ML/MIN/1.73 (ref 49–113)
GFR SERPL CREATININE-BSD FRML MDRD: >150 ML/MIN/1.73 (ref 60–113)
GFR SERPL CREATININE-BSD FRML MDRD: >150 ML/MIN/1.73 (ref 60–113)
GLOBULIN UR ELPH-MCNC: 2.3 GM/DL (ref 2.3–3.5)
GLUCOSE BLD-MCNC: 139 MG/DL (ref 60–100)
GLUCOSE BLD-MCNC: 78 MG/DL (ref 60–100)
HCT VFR BLD AUTO: 32.9 % (ref 39–49)
HGB BLD-MCNC: 12.3 G/DL (ref 13.7–17.3)
IMM GRANULOCYTES # BLD: 0.05 10*3/MM3 (ref 0–0.02)
IMM GRANULOCYTES NFR BLD: 0.9 % (ref 0–0.5)
LYMPHOCYTES # BLD AUTO: 1.16 10*3/MM3 (ref 0.6–4.2)
LYMPHOCYTES NFR BLD AUTO: 20.7 % (ref 10–50)
MCH RBC QN AUTO: 35 PG (ref 26.5–34)
MCHC RBC AUTO-ENTMCNC: 37.4 G/DL (ref 31.5–36.3)
MCV RBC AUTO: 93.7 FL (ref 80–98)
MONOCYTES # BLD AUTO: 0.68 10*3/MM3 (ref 0–0.9)
MONOCYTES NFR BLD AUTO: 12.1 % (ref 0–12)
NEUTROPHILS # BLD AUTO: 3.62 10*3/MM3 (ref 2–8.6)
NEUTROPHILS NFR BLD AUTO: 64.6 % (ref 37–80)
PLATELET # BLD AUTO: 187 10*3/MM3 (ref 150–450)
PMV BLD AUTO: 9.7 FL (ref 8–12)
POTASSIUM BLD-SCNC: 3.2 MMOL/L (ref 3.5–5.1)
POTASSIUM BLD-SCNC: 3.6 MMOL/L (ref 3.5–5.1)
PROT SERPL-MCNC: 5.3 G/DL (ref 6.3–8.6)
RBC # BLD AUTO: 3.51 10*6/MM3 (ref 4.37–5.74)
SODIUM BLD-SCNC: 122 MMOL/L (ref 137–145)
SODIUM BLD-SCNC: 127 MMOL/L (ref 137–145)
WBC NRBC COR # BLD: 5.61 10*3/MM3 (ref 3.2–9.8)

## 2018-04-04 PROCEDURE — 80053 COMPREHEN METABOLIC PANEL: CPT | Performed by: STUDENT IN AN ORGANIZED HEALTH CARE EDUCATION/TRAINING PROGRAM

## 2018-04-04 PROCEDURE — G8978 MOBILITY CURRENT STATUS: HCPCS

## 2018-04-04 PROCEDURE — G8979 MOBILITY GOAL STATUS: HCPCS

## 2018-04-04 PROCEDURE — 85025 COMPLETE CBC W/AUTO DIFF WBC: CPT | Performed by: FAMILY MEDICINE

## 2018-04-04 PROCEDURE — G8980 MOBILITY D/C STATUS: HCPCS

## 2018-04-04 PROCEDURE — 97161 PT EVAL LOW COMPLEX 20 MIN: CPT

## 2018-04-04 PROCEDURE — 99232 SBSQ HOSP IP/OBS MODERATE 35: CPT | Performed by: STUDENT IN AN ORGANIZED HEALTH CARE EDUCATION/TRAINING PROGRAM

## 2018-04-04 RX ORDER — SODIUM CHLORIDE 1000 MG
1 TABLET, SOLUBLE MISCELLANEOUS ONCE
Status: COMPLETED | OUTPATIENT
Start: 2018-04-04 | End: 2018-04-04

## 2018-04-04 RX ORDER — POTASSIUM CHLORIDE 750 MG/1
40 CAPSULE, EXTENDED RELEASE ORAL ONCE
Status: COMPLETED | OUTPATIENT
Start: 2018-04-04 | End: 2018-04-04

## 2018-04-04 RX ADMIN — ACETAMINOPHEN 650 MG: 325 TABLET ORAL at 22:25

## 2018-04-04 RX ADMIN — POTASSIUM CHLORIDE 40 MEQ: 750 CAPSULE, EXTENDED RELEASE ORAL at 08:22

## 2018-04-04 RX ADMIN — SODIUM CHLORIDE 125 ML/HR: 9 INJECTION, SOLUTION INTRAVENOUS at 08:31

## 2018-04-04 RX ADMIN — ACETAMINOPHEN 650 MG: 325 TABLET ORAL at 03:58

## 2018-04-04 RX ADMIN — SODIUM CHLORIDE TAB 1 GM 1 G: 1 TAB at 15:50

## 2018-04-04 RX ADMIN — SODIUM CHLORIDE 100 ML/HR: 9 INJECTION, SOLUTION INTRAVENOUS at 01:06

## 2018-04-04 RX ADMIN — LISINOPRIL 10 MG: 10 TABLET ORAL at 08:22

## 2018-04-04 RX ADMIN — PANTOPRAZOLE SODIUM 40 MG: 40 TABLET, DELAYED RELEASE ORAL at 08:22

## 2018-04-04 RX ADMIN — PANTOPRAZOLE SODIUM 40 MG: 40 TABLET, DELAYED RELEASE ORAL at 17:37

## 2018-04-04 RX ADMIN — TAMSULOSIN HYDROCHLORIDE 0.4 MG: 0.4 CAPSULE ORAL at 08:22

## 2018-04-04 NOTE — PLAN OF CARE
Problem: Nausea/Vomiting (Adult)  Goal: Identify Related Risk Factors and Signs and Symptoms  Outcome: Ongoing (interventions implemented as appropriate)    Goal: Adequate Hydration  Outcome: Ongoing (interventions implemented as appropriate)   04/04/18 1400   Nausea/Vomiting (Adult)   Adequate Hydration making progress toward outcome

## 2018-04-04 NOTE — PLAN OF CARE
Problem: Fall Risk (Adult)  Goal: Identify Related Risk Factors and Signs and Symptoms  Outcome: Ongoing (interventions implemented as appropriate)   04/04/18 0417   Fall Risk (Adult)   Related Risk Factors (Fall Risk) age-related changes   Signs and Symptoms (Fall Risk) presence of risk factors     Goal: Absence of Fall  Outcome: Outcome(s) achieved Date Met: 04/04/18      Problem: Patient Care Overview  Goal: Plan of Care Review  Outcome: Ongoing (interventions implemented as appropriate)    Goal: Individualization and Mutuality  Outcome: Ongoing (interventions implemented as appropriate)    Goal: Discharge Needs Assessment  Outcome: Ongoing (interventions implemented as appropriate)      Problem: Nausea/Vomiting (Adult)  Goal: Identify Related Risk Factors and Signs and Symptoms  Outcome: Ongoing (interventions implemented as appropriate)    Goal: Symptom Relief  Outcome: Outcome(s) achieved Date Met: 04/04/18    Goal: Adequate Hydration  Outcome: Ongoing (interventions implemented as appropriate)

## 2018-04-04 NOTE — PROGRESS NOTES
FAMILY MEDICINE DAILY PROGRESS NOTE  NAME: Barron Ackerman  : 1955  MRN: [id number]     LOS: 2 days     PROVIDER OF SERVICE: Kennedy Clay DO    Chief Complaint: Hyponatremia    Subjective:     Interval History:  History taken from: patient chart family  Patient Complaints: None--ready to go home  Patient Denies:  NV    Review of Systems:   Review of Systems   Constitutional: Negative for activity change, appetite change, fatigue and fever.   HENT: Negative for ear pain and sore throat.    Eyes: Negative for pain and visual disturbance.   Respiratory: Negative for cough and shortness of breath.    Cardiovascular: Negative for chest pain and palpitations.   Gastrointestinal: Positive for abdominal pain, nausea and vomiting.        Dysphagia   Endocrine: Negative for cold intolerance and heat intolerance.   Genitourinary: Negative for difficulty urinating and dysuria.   Musculoskeletal: Negative for arthralgias and gait problem.   Skin: Negative for color change and rash.   Neurological: Negative for dizziness, weakness and headaches.   Hematological: Negative for adenopathy. Does not bruise/bleed easily.   Psychiatric/Behavioral: Negative for agitation, confusion and sleep disturbance.       Objective:     Vital Signs  Temp:  [97.1 °F (36.2 °C)-98.4 °F (36.9 °C)] 97.1 °F (36.2 °C)  Heart Rate:  [66-88] 66  Resp:  [18-20] 18  BP: (105-140)/(64-70) 140/70    Physical Exam  Physical Exam   Constitutional: He is oriented to person, place, and time. He appears well-developed and well-nourished.   HENT:   Head: Normocephalic.   Right Ear: External ear normal.   Left Ear: External ear normal.   Nose: Nose normal.   Mouth/Throat: Oropharynx is clear and moist.   Eyes: Conjunctivae and EOM are normal. Pupils are equal, round, and reactive to light.   Neck: Normal range of motion. Neck supple.   Cardiovascular: Normal rate, regular rhythm, normal heart sounds and intact distal pulses.    Pulmonary/Chest: Effort  normal and breath sounds normal.   Abdominal: Soft. Bowel sounds are normal.   Musculoskeletal: Normal range of motion.   Neurological: He is alert and oriented to person, place, and time. He has normal reflexes.   Skin: Skin is warm and dry.   Vitals reviewed.      Medication Review    Current Facility-Administered Medications:   •  acetaminophen (TYLENOL) tablet 650 mg, 650 mg, Oral, Q4H PRN, Janusz Patrick MD, 650 mg at 04/04/18 0358  •  hydrALAZINE (APRESOLINE) injection 20 mg, 20 mg, Intravenous, Q4H PRN, Janusz Patrick MD  •  lisinopril (PRINIVIL,ZESTRIL) tablet 10 mg, 10 mg, Oral, Q24H, Gilma Britt MD, 10 mg at 04/04/18 0822  •  ondansetron (ZOFRAN) tablet 4 mg, 4 mg, Oral, Q6H PRN, Janusz Patrick MD  •  pantoprazole (PROTONIX) EC tablet 40 mg, 40 mg, Oral, BID AC, Janusz Patrick MD, 40 mg at 04/04/18 0822  •  sodium chloride 0.9 % flush 1-10 mL, 1-10 mL, Intravenous, PRN, Janusz Patrick MD  •  sodium chloride 0.9 % infusion, 125 mL/hr, Intravenous, Continuous, ERNST Laar, Last Rate: 125 mL/hr at 04/04/18 0831, 125 mL/hr at 04/04/18 0831  •  tamsulosin (FLOMAX) 24 hr capsule 0.4 mg, 0.4 mg, Oral, Q24H, Janusz Patrick MD, 0.4 mg at 04/04/18 0822     Diagnostic Data    Lab Results (last 24 hours)     Procedure Component Value Units Date/Time    Comprehensive Metabolic Panel [[id number]]  (Abnormal) Collected:  04/04/18 0550    Specimen:  Blood Updated:  04/04/18 0657     Glucose 78 mg/dL      BUN 4 (L) mg/dL      Creatinine 0.52 (L) mg/dL      Sodium 122 (L) mmol/L      Potassium 3.2 (L) mmol/L      Chloride 92 (L) mmol/L      CO2 24.0 mmol/L      Calcium 8.2 (L) mg/dL      Total Protein 5.3 (L) g/dL      Albumin 3.00 (L) g/dL      ALT (SGPT) 95 (H) U/L      AST (SGOT) 50 U/L      Alkaline Phosphatase 47 U/L      Total Bilirubin 0.7 mg/dL      eGFR Non African Amer >150 mL/min/1.73      eGFR  African Amer 195 (H) mL/min/1.73      Globulin 2.3 gm/dL      A/G Ratio 1.3 g/dL       BUN/Creatinine Ratio 7.7     Anion Gap 6.0 mmol/L     CBC & Differential [[id number]] Collected:  04/04/18 0550    Specimen:  Blood Updated:  04/04/18 0648    Narrative:       The following orders were created for panel order CBC & Differential.  Procedure                               Abnormality         Status                     ---------                               -----------         ------                     Scan Slide[[id number]]                                                                  CBC Auto Differential[[id number]]        Abnormal            Final result                 Please view results for these tests on the individual orders.    CBC Auto Differential [[id number]]  (Abnormal) Collected:  04/04/18 0550    Specimen:  Blood Updated:  04/04/18 0628     WBC 5.61 10*3/mm3      RBC 3.51 (L) 10*6/mm3      Hemoglobin 12.3 (L) g/dL      Hematocrit 32.9 (L) %      MCV 93.7 fL      MCH 35.0 (H) pg      MCHC 37.4 (H) g/dL      RDW 11.5 %      RDW-SD 39.1 fl      MPV 9.7 fL      Platelets 187 10*3/mm3      Neutrophil % 64.6 %      Lymphocyte % 20.7 %      Monocyte % 12.1 (H) %      Eosinophil % 1.2 %      Basophil % 0.5 %      Immature Grans % 0.9 (H) %      Neutrophils, Absolute 3.62 10*3/mm3      Lymphocytes, Absolute 1.16 10*3/mm3      Monocytes, Absolute 0.68 10*3/mm3      Eosinophils, Absolute 0.07 10*3/mm3      Basophils, Absolute 0.03 10*3/mm3      Immature Grans, Absolute 0.05 (H) 10*3/mm3     Sodium [[id number]]  (Abnormal) Collected:  04/03/18 1944    Specimen:  Blood Updated:  04/03/18 2010     Sodium 119 (C) mmol/L     Sodium [[id number]]  (Abnormal) Collected:  04/03/18 1545    Specimen:  Blood Updated:  04/03/18 1634     Sodium 122 (L) mmol/L     Sodium [[id number]]  (Abnormal) Collected:  04/03/18 1320    Specimen:  Blood Updated:  04/03/18 1343     Sodium 119 (C) mmol/L             I reviewed the patient's new clinical results.  I reviewed the patient's new imaging results and agree with the  interpretation.  I reviewed the patient's other test results and agree with the interpretation    Assessment/Plan:     Active Hospital Problems (** Indicates Principal Problem)    Diagnosis Date Noted   • **Hyponatremia [E87.1] 04/02/2018     Na 114 today in the ER. 2 week hx of vomiting with no food intake.  -Nephrology consulted. Will correct the sodium 8-10 MG daily point per day.  -ER started 200cc bolus hypertonic saline.  -Urine Na, Osmolality, and creatinine normal.  -Uric acid and TSH and 8 AM cortisol normal.  -Na 122.  -NS IVF 70cc/hr.  -Nephrology correcting abnormalities.     • Other dysphagia [R13.19] 04/04/2018     -Vague history of occasional difficulty swallowing.  -Given hyponatremia, extensive smoking history, plan to further evaluate once electrolytes corrected.     • Hypokalemia [E87.6] 04/02/2018     Potassium 2.3 in the ER on admission.  - EKG unremarkable.  -K 3.2.  -Administer 40meq KCl.  -On the floor on Tele.     • Hypertension [I10] 04/02/2018     -Stable  -Continue home med of Prinzide.     • Gastroesophageal reflux disease [K21.9] 04/02/2018     Stable  -Continue home medication PPI.     • Benign prostatic hyperplasia with lower urinary tract symptoms [N40.1] 04/02/2018     -Continue Flomax from home.        Resolved Hospital Problems    Diagnosis Date Noted Date Resolved   No resolved problems to display.       Electrolyte disturbance correcting per nephrology   Will investigate dysphagia.    Code status is Full Code    Plan for disposition:Where: To floor    I have examined the patient and reviewed the pertinent diagnostic data. I have discussed the case with the Family Medicine Resident and agree with the assessment and plan of care.    Kennedy Clay DO           This document has been electronically signed by Kennedy Clay DO on April 4, 2018 10:19 AM

## 2018-04-04 NOTE — PLAN OF CARE
Problem: Patient Care Overview  Goal: Plan of Care Review  Outcome: Outcome(s) achieved Date Met: 04/04/18 04/04/18 1015   Coping/Psychosocial   Plan of Care Reviewed With patient   OTHER   Outcome Summary PT evaluation completed today. Pt is independent with all bed mobility, transfers, gait and steps. He scored 28/28 on Tinetti Balance assessment and does not require skilled PT at this time. Pt is safe for d/c home from PT persective when medically cleared.

## 2018-04-04 NOTE — THERAPY DISCHARGE NOTE
Acute Care - Physical Therapy Initial Eval/Discharge  [facility]     Patient Name: Barron Ackerman  : 1955  MRN: [id number]  Today's Date: 2018   Onset of Illness/Injury or Date of Surgery: 18  Date of Referral to PT: 18  Referring Physician: Dr. Patrick      Admit Date: 2018    Visit Dx:    ICD-10-CM ICD-9-CM   1. Hyponatremia E87.1 276.1   2. Hypokalemia E87.6 276.8   3. General weakness R53.1 780.79   4. Chest pain, unspecified type R07.9 786.50     Patient Active Problem List   Diagnosis   • Hyponatremia   • Benign prostatic hyperplasia with lower urinary tract symptoms   • Gastroesophageal reflux disease   • Hypertension   • Hypertension, benign   • Other obstructive and reflux uropathy   • Sinusitis chronic, frontal   • Hypokalemia   • Other dysphagia     Past Medical History:   Diagnosis Date   • Hypertension      Past Surgical History:   Procedure Laterality Date   • HERNIA REPAIR            PT ASSESSMENT (last 72 hours)      Physical Therapy Evaluation     Row Name 18 0943          PT Evaluation Time/Intention    Subjective Information complains of;weakness  -MN     Document Type evaluation;discharge evaluation/summary  -MN     Mode of Treatment individual therapy;physical therapy  -MN     Patient Effort good  -MN     Row Name 18 0943          General Information    Patient Profile Reviewed? yes  -MN     Onset of Illness/Injury or Date of Surgery 18  -MN     Referring Physician Dr. Patrick  -MN     Patient Observations alert;cooperative;agree to therapy  -MN     General Observations of Patient Supine HOB up +IV   -MN     Prior Level of Function independent:;all household mobility;community mobility;ADL's;cooking;cleaning;driving  -MN     Equipment Currently Used at Home none  -MN     Existing Precautions/Restrictions no known precautions/restrictions  -MN     Risks Reviewed patient:;LOB;nausea/vomiting;dizziness;increased discomfort;change in vital  signs;increased drainage;lines disloged  -MN     Barriers to Rehab none identified  -MN     Row Name 04/04/18 0943          Relationship/Environment    Lives With spouse  -MN     Row Name 04/04/18 0943          Resource/Environmental Concerns    Current Living Arrangements home/apartment/condo  -MN     Row Name 04/04/18 0943          Home Main Entrance    Number of Stairs, Main Entrance two  -MN     Stair Railings, Main Entrance railing on left side (ascending)  -MN     Row Name 04/04/18 0943          Cognitive Assessment/Interventions    Additional Documentation Cognitive Assessment/Intervention (Group)  -MN     Row Name 04/04/18 0943          Cognitive Assessment/Intervention- PT/OT    Orientation Status (Cognition) oriented x 4  -MN     Follows Commands (Cognition) WNL  -MN     Cognitive Function (Cognitive) WNL  -MN     Personal Safety Interventions gait belt;nonskid shoes/slippers when out of bed  -MN     Row Name 04/04/18 0943          Bed Mobility Assessment/Treatment    Bed Mobility Assessment/Treatment supine-sit-supine  -MN     Supine-Sit-Supine [place] (Bed Mobility) conditional independence  -MN     Assistive Device (Bed Mobility) head of bed elevated  -MN     Row Name 04/04/18 0943          Transfer Assessment/Treatment    Transfer Assessment/Treatment sit-stand transfer;stand-sit transfer  -MN     Sit-Stand [place] (Transfers) independent  -MN     Stand-Sit [place] (Transfers) independent  -MN     Row Name 04/04/18 0943          Gait/Stairs Assessment/Training    Gait/Stairs Assessment/Training gait/ambulation independence  -MN     [place] Level (Gait) independent  -MN     Distance in Feet (Gait) 300 ft  -MN     Negotiation (Stairs) stairs independence  -MN     [place] Level (Stairs) conditional independence  -MN     Handrail Location (Stairs) left side (ascending)  -MN     Number of Steps (Stairs) 3x2  -MN     Ascending Technique (Stairs) step-over-step  -MN     Descending  "Technique (Stairs) step-over-step  -MN     [facility] Name 04/04/18 0943          General ROM    GENERAL ROM COMMENTS NO ROM deficits BLEs  -MN     [facility] Name 04/04/18 0943          General Assessment (Manual Muscle Testing)    General Manual Muscle Testing (MMT) Assessment no strength deficits identified  -MN     [facility] Name 04/04/18 0943          Sensory Assessment/Intervention    Sensory General Assessment no sensation deficits identified  -MN     [facility] Name 04/04/18 0943          Vision Assessment/Intervention    Visual Impairment/Limitations WFL  -MN     [facility] Name 04/04/18 0943          Plan of Care Review    Plan of Care Reviewed With patient  -MN     [facility] Name 04/04/18 0943          Physical Therapy Clinical Impression    Date of Referral to PT 04/03/18  -MN     Patient/Family Goals Statement (PT Clinical Impression) \"Go home\"  -MN     Criteria for Skilled Interventions Met (PT Clinical Impression) no;no problems identified which require skilled intervention;does not meet criteria for skilled intervention  -MN     Care Plan Review (PT) evaluation/treatment results reviewed;patient/other agree to care plan  -MN     [facility] Name 04/04/18 0943          Vital Signs    Pre Systolic BP Rehab 135  -MN     Pre Treatment Diastolic BP 78  -MN     Post Systolic BP Rehab 133  -MN     Post Treatment Diastolic BP 77  -MN     Pretreatment Heart Rate (beats/min) 74  -MN     Intratreatment Heart Rate (beats/min) 77  -MN     Posttreatment Heart Rate (beats/min) 74  -MN     Pre SpO2 (%) 98  -MN     O2 Delivery Pre Treatment room air  -MN     Intra SpO2 (%) 98  -MN     O2 Delivery Intra Treatment room air  -MN     Post SpO2 (%) 98  -MN     O2 Delivery Post Treatment room air  -MN     Pre Patient Position Supine  -MN     Intra Patient Position Standing  -MN     Post Patient Position Supine  -MN     Row Name 04/04/18 0943          Positioning and Restraints    Pre-Treatment Position in bed  -MN     Post Treatment Position bed  -MN     In Bed " "fowlers;call light within reach  -MN     Row Name 04/04/18 0943          Living Environment    Home Accessibility stairs to enter home  -MN       User Key  (r) = Recorded By, (t) = Taken By, (c) = Cosigned By    Initials Name Provider Type    RAFAEL Arguello, PT Physical Therapist              PT Recommendation and Plan  Anticipated Discharge Disposition (PT): home or self care  Therapy Frequency (PT Clinical Impression): evaluation only  Outcome Summary/Treatment Plan (PT)  Anticipated Discharge Disposition (PT): home or self care  Plan of Care Reviewed With: patient  Outcome Summary: PT evaluation completed today. Pt is independent with all bed mobility, transfers, gait and steps. He scored 28/28 on Tinetti Balance assessment and does not require skilled PT at this time. Pt is safe for d/c home from PT persective when medically cleared.          Outcome Measures     Row Name 04/04/18 0943             How much help from another person do you currently need...    Turning from your back to your side while in flat bed without using bedrails? 4  -MN      Moving from lying on back to sitting on the side of a flat bed without bedrails? 4  -MN      Moving to and from a bed to a chair (including a wheelchair)? 4  -MN      Standing up from a chair using your arms (e.g., wheelchair, bedside chair)? 4  -MN      Climbing 3-5 steps with a railing? 4  -MN      To walk in hospital room? 4  -MN      AM-PAC 6 Clicks Score 24  -MN         Tinetti Assessment    Tinetti Assessment yes  -MN      Sitting Balance 1  -MN      Arises 2  -MN      Attempts to Rise 2  -MN      Immediate Standing Balance (first 5 sec) 2  -MN      Standing Balance 2  -MN      Sternal Nudge (feet close together) 2  -MN      Eyes Closed (feet close together) 1  -MN      Turning 360 Degrees- Steps 1  -MN      Turning 360 Degrees- Steadiness 1  -MN      Sitting Down 2  -MN      Tinetti Balance Score 16  -MN      Gait Initiation (immediate after told \"go\") 1  " -MN      Step Length- Right Swing 1  -MN      Step Length- Left Swing 1  -MN      Foot Clearance- Right Foot 1  -MN      Foot Clearance- Left Foot 1  -MN      Step Symmetry 1  -MN      Step Continuity 1  -MN      Path (excursion) 2  -MN      Trunk 2  -MN      Base of Support 1  -MN      Gait Score 12  -MN      Tinetti Total Score 28  -MN         Functional Assessment    Outcome Measure Options AM-PAC 6 Clicks Basic Mobility (PT);Tinetti  -MN        User Key  (r) = Recorded By, (t) = Taken By, (c) = Cosigned By    Initials Name Provider Type    MN Priya Arguello, PT Physical Therapist           Time Calculation:         PT Charges     Row Name 04/04/18 1014             Time Calculation    Start Time 0943  -MN      Stop Time 1008  -MN      Time Calculation (min) 25 min  -MN      PT Received On 04/04/18  -MN      PT Goal Re-Cert Due Date 04/17/18  -MN        User Key  (r) = Recorded By, (t) = Taken By, (c) = Cosigned By    Initials Name Provider Type    MN Priya Arguello PT Physical Therapist          Therapy Charges for Today     Code Description Service Date Service Provider Modifiers Qty    12283733946 HC PT MOBILITY CURRENT 4/4/2018 Priya Arguello PT GP, [place] 1    95279717728 HC PT MOBILITY PROJECTED 4/4/2018 Priya Arguello PT GP, [place] 1    61523310460 HC PT MOBILITY DISCHARGE 4/4/2018 Priya Arguello PT GP, [place] 1    54681254530 HC PT EVAL LOW COMPLEXITY 2 4/4/2018 Priya Arguello, PT GP 1          PT G-Codes  PT Professional Judgement Used?: Yes  Outcome Measure Options: AM-PAC 6 Clicks Basic Mobility (PT), Tinetti  Score: 28  Functional Limitation: Mobility: Walking and moving around  Mobility: Walking and Moving Around Current Status (): 0 percent impaired, limited or restricted  Mobility: Walking and Moving Around Goal Status (): 0 percent impaired, limited or restricted  Mobility: Walking and Moving Around Discharge Status (): 0 percent impaired, limited or restricted    PT Discharge  Summary  Anticipated Discharge Disposition (PT): home or self care    Priya Arguello, PT  4/4/2018

## 2018-04-04 NOTE — PLAN OF CARE
Problem: Fall Risk (Adult)  Intervention: Reduce Risk/Promote Restraint Free Environment   04/04/18 1313   Safety Management   Environmental Safety Modification clutter free environment maintained;lighting adjusted

## 2018-04-04 NOTE — PROGRESS NOTES
FAMILY MEDICINE DAILY PROGRESS NOTE    NAME: Barron Ackerman  : 1955  MRN: [id number]      LOS: 2 days     PROVIDER OF SERVICE: Janusz Patrick MD    Chief Complaint: Hyponatremia    Subjective:     Interval History:  History taken from: patient chart RN  Patient Complaints: fatigue    No history of events last night.  Patient states he is feeling better. Wants to go home.  No nausea, vomiting, diarrhea. Vague history of occasional difficulty swallowing. He has no other concerns/complaints that he would like to discuss.    Review of Systems:   Review of Systems   Constitutional: Negative for activity change, appetite change, chills, diaphoresis, fatigue and fever.   HENT: Positive for congestion and trouble swallowing. Negative for ear pain, rhinorrhea, sneezing and sore throat.    Eyes: Negative for pain, redness, itching and visual disturbance.   Respiratory: Negative for cough, choking, chest tightness, shortness of breath, wheezing and stridor.    Cardiovascular: Negative for chest pain, palpitations and leg swelling.   Gastrointestinal: Negative for abdominal distention, abdominal pain, blood in stool, constipation, diarrhea, nausea, rectal pain and vomiting.   Genitourinary: Negative for difficulty urinating, dysuria, flank pain and frequency.   Skin: Negative for color change and rash.   Neurological: Negative for dizziness, seizures, syncope, weakness, light-headedness, numbness and headaches.   Psychiatric/Behavioral: Negative for agitation, confusion, decreased concentration and sleep disturbance. The patient is not nervous/anxious.    All other systems reviewed and are negative.      Objective:     Vital Signs  Temp:  [97.1 °F (36.2 °C)-98.4 °F (36.9 °C)] 97.1 °F (36.2 °C)  Heart Rate:  [70-88] 77  Resp:  [18-20] 20  BP: (105-149)/(64-80) 124/70  Body mass index is 23.63 kg/m².    Physical Exam  Physical Exam   Constitutional: He is oriented to person, place, and time. He appears well-developed  and well-nourished.  Non-toxic appearance. He does not have a sickly appearance. He does not appear ill. No distress.   HENT:   Head: Normocephalic and atraumatic.   Right Ear: External ear normal. No lacerations. No swelling or tenderness.   Left Ear: External ear normal. No lacerations. No swelling or tenderness.   Nose: Nose normal.   Mouth/Throat: Uvula is midline, oropharynx is clear and moist and mucous membranes are normal.   Eyes: Conjunctivae, EOM and lids are normal. Pupils are equal, round, and reactive to light. No scleral icterus.   Neck: No tracheal deviation present. No thyromegaly present.   Cardiovascular: Normal rate, regular rhythm, normal heart sounds and intact distal pulses.  Exam reveals no gallop, no distant heart sounds and no friction rub.    No murmur heard.  Pulses:       Carotid pulses are 2+ on the right side, and 2+ on the left side.       Radial pulses are 2+ on the right side, and 2+ on the left side.        Dorsalis pedis pulses are 2+ on the right side, and 2+ on the left side.        Posterior tibial pulses are 2+ on the right side, and 2+ on the left side.   Pulmonary/Chest: Effort normal. No accessory muscle usage or stridor. No respiratory distress. He has no decreased breath sounds. He has no wheezes. He has rhonchi in the left lower field. He has no rales. He exhibits no tenderness.   Abdominal: Soft. Bowel sounds are normal. He exhibits no shifting dullness, no distension and no ascites. There is no tenderness. There is no rigidity, no rebound and no guarding.   Musculoskeletal:        Right lower leg: He exhibits no swelling.        Left lower leg: He exhibits no swelling.        Right foot: There is no swelling.        Left foot: There is no swelling.     Vascular Status -  His right foot exhibits normal foot vasculature  and no edema. His left foot exhibits normal foot vasculature  and no edema.  Lymphadenopathy:     He has no cervical adenopathy.   Neurological: He is  alert and oriented to person, place, and time. He has normal strength. No cranial nerve deficit or sensory deficit. He exhibits normal muscle tone. GCS eye subscore is 4. GCS verbal subscore is 5. GCS motor subscore is 6.   Skin: Skin is warm and dry. No rash noted. He is not diaphoretic. No erythema. No pallor.   Psychiatric: He has a normal mood and affect. His speech is normal.   Nursing note and vitals reviewed.      Medication Review    Current Facility-Administered Medications:   •  acetaminophen (TYLENOL) tablet 650 mg, 650 mg, Oral, Q4H PRN, Janusz Patrick MD, 650 mg at 04/04/18 0358  •  hydrALAZINE (APRESOLINE) injection 20 mg, 20 mg, Intravenous, Q4H PRN, Janusz Patrick MD  •  lisinopril (PRINIVIL,ZESTRIL) tablet 10 mg, 10 mg, Oral, Q24H, Gilma Britt MD, 10 mg at 04/03/18 0859  •  ondansetron (ZOFRAN) tablet 4 mg, 4 mg, Oral, Q6H PRN, Janusz Patrick MD  •  pantoprazole (PROTONIX) EC tablet 40 mg, 40 mg, Oral, BID AC, Janusz Patrick MD, 40 mg at 04/03/18 1829  •  potassium chloride (MICRO-K) CR capsule 40 mEq, 40 mEq, Oral, Once, Janusz Patrick MD  •  sodium chloride 0.9 % flush 1-10 mL, 1-10 mL, Intravenous, PRN, Janusz Patrick MD  •  sodium chloride 0.9 % infusion, 125 mL/hr, Intravenous, Continuous, Gilma Britt MD, Last Rate: 125 mL/hr at 04/04/18 0514, 125 mL/hr at 04/04/18 0514  •  tamsulosin (FLOMAX) 24 hr capsule 0.4 mg, 0.4 mg, Oral, Q24H, Janusz Patrick MD, 0.4 mg at 04/03/18 0859     Diagnostic Data    Lab Results (last 24 hours)     Procedure Component Value Units Date/Time    Comprehensive Metabolic Panel [[id number]]  (Abnormal) Collected:  04/04/18 0550    Specimen:  Blood Updated:  04/04/18 0657     Glucose 78 mg/dL      BUN 4 (L) mg/dL      Creatinine 0.52 (L) mg/dL      Sodium 122 (L) mmol/L      Potassium 3.2 (L) mmol/L      Chloride 92 (L) mmol/L      CO2 24.0 mmol/L      Calcium 8.2 (L) mg/dL      Total Protein 5.3 (L) g/dL      Albumin 3.00 (L) g/dL      ALT (SGPT) 95  (H) U/L      AST (SGOT) 50 U/L      Alkaline Phosphatase 47 U/L      Total Bilirubin 0.7 mg/dL      eGFR Non African Amer >150 mL/min/1.73      eGFR  African Amer 195 (H) mL/min/1.73      Globulin 2.3 gm/dL      A/G Ratio 1.3 g/dL      BUN/Creatinine Ratio 7.7     Anion Gap 6.0 mmol/L     CBC & Differential [[id number]] Collected:  04/04/18 0550    Specimen:  Blood Updated:  04/04/18 0648    Narrative:       The following orders were created for panel order CBC & Differential.  Procedure                               Abnormality         Status                     ---------                               -----------         ------                     Scan Slide[[id number]]                                                                  CBC Auto Differential[[id number]]        Abnormal            Final result                 Please view results for these tests on the individual orders.    CBC Auto Differential [[id number]]  (Abnormal) Collected:  04/04/18 0550    Specimen:  Blood Updated:  04/04/18 0628     WBC 5.61 10*3/mm3      RBC 3.51 (L) 10*6/mm3      Hemoglobin 12.3 (L) g/dL      Hematocrit 32.9 (L) %      MCV 93.7 fL      MCH 35.0 (H) pg      MCHC 37.4 (H) g/dL      RDW 11.5 %      RDW-SD 39.1 fl      MPV 9.7 fL      Platelets 187 10*3/mm3      Neutrophil % 64.6 %      Lymphocyte % 20.7 %      Monocyte % 12.1 (H) %      Eosinophil % 1.2 %      Basophil % 0.5 %      Immature Grans % 0.9 (H) %      Neutrophils, Absolute 3.62 10*3/mm3      Lymphocytes, Absolute 1.16 10*3/mm3      Monocytes, Absolute 0.68 10*3/mm3      Eosinophils, Absolute 0.07 10*3/mm3      Basophils, Absolute 0.03 10*3/mm3      Immature Grans, Absolute 0.05 (H) 10*3/mm3     Sodium [[id number]]  (Abnormal) Collected:  04/03/18 1944    Specimen:  Blood Updated:  04/03/18 2010     Sodium 119 (C) mmol/L     Sodium [[id number]]  (Abnormal) Collected:  04/03/18 1545    Specimen:  Blood Updated:  04/03/18 1634     Sodium 122 (L) mmol/L     Sodium [[id number]]   (Abnormal) Collected:  04/03/18 1320    Specimen:  Blood Updated:  04/03/18 1343     Sodium 119 (C) mmol/L     TSH [[id number]]  (Normal) Collected:  04/03/18 0822    Specimen:  Blood Updated:  04/03/18 0950     TSH 0.700 mIU/mL     Comprehensive Metabolic Panel [[id number]]  (Abnormal) Collected:  04/03/18 0822    Specimen:  Blood Updated:  04/03/18 0925     Glucose 73 mg/dL      BUN 8 mg/dL      Creatinine 0.57 (L) mg/dL      Sodium 120 (C) mmol/L      Potassium 3.4 (L) mmol/L      Chloride 84 (L) mmol/L      CO2 23.0 mmol/L      Calcium 8.4 mg/dL      Total Protein 5.9 (L) g/dL      Albumin 3.50 g/dL      ALT (SGPT) 136 (H) U/L      AST (SGOT) 85 (H) U/L      Alkaline Phosphatase 54 U/L      Total Bilirubin 1.0 mg/dL      eGFR Non African Amer 145 (H) mL/min/1.73      eGFR  African Amer 176 (H) mL/min/1.73      Globulin 2.4 gm/dL      A/G Ratio 1.5 g/dL      BUN/Creatinine Ratio 14.0     Anion Gap 13.0 mmol/L     Magnesium [[id number]]  (Normal) Collected:  04/03/18 0822    Specimen:  Blood Updated:  04/03/18 0921     Magnesium 2.0 mg/dL     Phosphorus [[id number]]  (Normal) Collected:  04/03/18 0822    Specimen:  Blood Updated:  04/03/18 0921     Phosphorus 2.6 mg/dL     CBC & Differential [[id number]] Collected:  04/03/18 0822    Specimen:  Blood Updated:  04/03/18 0912    Narrative:       The following orders were created for panel order CBC & Differential.  Procedure                               Abnormality         Status                     ---------                               -----------         ------                     Scan Slide[[id number]]                                                                  CBC Auto Differential[[id number]]        Abnormal            Final result                 Please view results for these tests on the individual orders.    CBC Auto Differential [[id number]]  (Abnormal) Collected:  04/03/18 0822    Specimen:  Blood Updated:  04/03/18 0912     WBC 6.00 10*3/mm3      RBC 3.72  (L) 10*6/mm3      Hemoglobin 13.3 (L) g/dL      Hematocrit 34.4 (L) %      MCV 92.5 fL      MCH 35.8 (H) pg      MCHC 38.7 (H) g/dL      RDW 11.4 (L) %      RDW-SD 38.7 fl      MPV 10.0 fL      Platelets 195 10*3/mm3      Neutrophil % 62.4 %      Lymphocyte % 22.7 %      Monocyte % 12.7 (H) %      Eosinophil % 0.7 %      Basophil % 0.8 %      Immature Grans % 0.7 (H) %      Neutrophils, Absolute 3.75 10*3/mm3      Lymphocytes, Absolute 1.36 10*3/mm3      Monocytes, Absolute 0.76 10*3/mm3      Eosinophils, Absolute 0.04 10*3/mm3      Basophils, Absolute 0.05 10*3/mm3      Immature Grans, Absolute 0.04 (H) 10*3/mm3         I reviewed the patient's new clinical results.    Assessment/Plan:     Active Hospital Problems (** Indicates Principal Problem)    Diagnosis Date Noted   • **Hyponatremia [E87.1] 04/02/2018     Priority: High     Na 114 today in the ER. 2 week hx of vomiting with no food intake.  -Nephrology consulted. Will correct the sodium 8-10 MG daily point per day.  -ER started 200cc bolus hypertonic saline.  -Urine Na, Osmolality, and creatinine normal.  -Uric acid and TSH and 8 AM cortisol normal.  -Na 122.  -NS IVF 70cc/hr.  -Nephrology correcting abnormalities.     • Hypokalemia [E87.6] 04/02/2018     Priority: High     Potassium 2.3 in the ER on admission.  - EKG unremarkable.  -K 3.2.  -Administer 40meq KCl.  -On the floor on Tele.     • Other dysphagia [R13.19] 04/04/2018     Priority: Medium     -Vague history of occasional difficulty swallowing.  -Given hyponatremia, extensive smoking history, plan to further evaluate once electrolytes corrected.     • Hypertension [I10] 04/02/2018     -Stable  -Continue home med of Prinzide.     • Gastroesophageal reflux disease [K21.9] 04/02/2018     Stable  -Continue home medication PPI.     • Benign prostatic hyperplasia with lower urinary tract symptoms [N40.1] 04/02/2018     -Continue Flomax from home.        Resolved Hospital Problems    Diagnosis Date Noted  Date Resolved   No resolved problems to display.       DVT prophylaxis: SCDs/TEDs  Code status is Full Code    Plan for disposition:Where: home and When:  electrolytes correct and pt stable.        This document has been electronically signed by Janusz Patrick MD on April 4, 2018 7:14 AM

## 2018-04-04 NOTE — PROGRESS NOTES
"[facility]  [address] [place] [address]. [id number]  T - [phone]  F - [phone]     Progress Note          PATIENT  DEMOGRAPHICS   PATIENT NAME: Barron Ackerman                      PHYSICIAN: ERNST Lara  : 1955  MRN: [id number]   LOS: 2 days    Patient Care Team:  ERNST Celestin as PCP - General  Subjective   SUBJECTIVE   Feels well no soa, no n/v/d, improved PO intake.       Objective   OBJECTIVE   Vital Signs  Temp:  [97.1 °F (36.2 °C)-98.4 °F (36.9 °C)] 97.1 °F (36.2 °C)  Heart Rate:  [66-88] 66  Resp:  [18-20] 18  BP: (105-140)/(64-70) 140/70    Flowsheet Rows    Flowsheet Row First Filed Value   Admission Height 177.8 cm (70\") Documented at 2018 1423   Admission Weight 75.8 kg (167 lb) Documented at 2018 1423           I/O last 3 completed shifts:  In: 1808 [P.O.:770; I.V.:738; IV Piggyback:300]  Out: 2500 [Urine:2500]    PHYSICAL EXAM    Physical Exam   Constitutional: He is oriented to person, place, and time. He appears well-developed.   HENT:   Head: Normocephalic.   Eyes: Pupils are equal, round, and reactive to light.   Cardiovascular: Normal rate, regular rhythm and normal heart sounds.    Pulmonary/Chest: Effort normal and breath sounds normal.   Abdominal: Soft. Bowel sounds are normal.   Neurological: He is alert and oriented to person, place, and time.       RESULTS   Results Review:      Results from last 7 days  Lab Units 18  0550 18  1944 18  1545  18  0822 18  2123  18  1431   SODIUM mmol/L 122* 119* 122*  < > 120* 117*  < > 114*   POTASSIUM mmol/L 3.2*  --   --   --  3.4* 3.1*  --  2.3*   CHLORIDE mmol/L 92*  --   --   --  84*  --   --  72*   CO2 mmol/L 24.0  --   --   --  23.0  --   --  29.0   BUN mg/dL 4*  --   --   --  8  --   --  8   CREATININE mg/dL 0.52*  --   --   --  0.57*  --   --  0.60*   CALCIUM mg/dL 8.2*  --   --   --  8.4  --   --  8.8   BILIRUBIN mg/dL 0.7  --   --   --  1.0  --  "  --  1.4*   ALK PHOS U/L 47  --   --   --  54  --   --  65   ALT (SGPT) U/L 95*  --   --   --  136*  --   --  152*   AST (SGOT) U/L 50  --   --   --  85*  --   --  97*   GLUCOSE mg/dL 78  --   --   --  73  --   --  102*   < > = values in this interval not displayed.    Estimated Creatinine Clearance: 155.6 mL/min (by C-G formula based on SCr of 0.52 mg/dL (L)).      Results from last 7 days  Lab Units 04/03/18  0822   MAGNESIUM mg/dL 2.0   PHOSPHORUS mg/dL 2.6         Results from last 7 days  Lab Units 04/02/18  1804   URIC ACID mg/dL 3.2         Results from last 7 days  Lab Units 04/04/18  0550 04/03/18  0822 04/02/18  1431   WBC 10*3/mm3 5.61 6.00 6.18   HEMOGLOBIN g/dL 12.3* 13.3* 14.8   PLATELETS 10*3/mm3 187 195 197         Results from last 7 days  Lab Units 04/02/18  1431   INR  0.89         Imaging Results (last 24 hours)     ** No results found for the last 24 hours. **           MEDICATIONS      lisinopril 10 mg Oral Q24H   pantoprazole 40 mg Oral BID AC   tamsulosin 0.4 mg Oral Q24H       sodium chloride 125 mL/hr Last Rate: 125 mL/hr (04/04/18 0831)       Assessment/Plan   ASSESSMENT / PLAN    Principal Problem:    Hyponatremia  Active Problems:    Benign prostatic hyperplasia with lower urinary tract symptoms    Gastroesophageal reflux disease    Hypertension    Hypokalemia    Other dysphagia    1. Hyponatremia- Patient urine osmolality is not extremely high and appears lower than serum osmolality. I believe he is hypovolemic in the setting of recent illness, vomiting, diarrhea, and also superimposed HCTZ in the background leading to more of a hypovolemic picture.  He also has weight loss and prior history of smoking. CXR is negative. Cortisol is normal. TSH is low normal.     -Sodium stable from yesterday afternoon, no evidence of fluid overload, increased saline to 125 ml/hr this morning    -Keep saline at 125 ml/hr    -Will correct the sodium 8-10 MEQ point per day.     2. Hypertension- Keep  lisinopril, hctz is on hold    3. Hypokalemia - Replaced, now low again. Received 40 mEq today and will recheck with sodium this afternoon. Pt has received IV potassium yesterday, will avoid as pt is receiving free water with potassium IVPB.              This document has been electronically signed by ERNST Lara on April 4, 2018 9:02 AM

## 2018-04-05 LAB
ALBUMIN SERPL-MCNC: 2.6 G/DL (ref 3.4–4.8)
ALBUMIN/GLOB SERPL: 1.2 G/DL (ref 1.1–1.8)
ALP SERPL-CCNC: 44 U/L (ref 38–126)
ALT SERPL W P-5'-P-CCNC: 75 U/L (ref 21–72)
ANION GAP SERPL CALCULATED.3IONS-SCNC: 7 MMOL/L (ref 5–15)
ANION GAP SERPL CALCULATED.3IONS-SCNC: 9 MMOL/L (ref 5–15)
AST SERPL-CCNC: 29 U/L (ref 17–59)
BASOPHILS # BLD AUTO: 0.03 10*3/MM3 (ref 0–0.2)
BASOPHILS NFR BLD AUTO: 0.6 % (ref 0–2)
BILIRUB SERPL-MCNC: 0.3 MG/DL (ref 0.2–1.3)
BUN BLD-MCNC: 5 MG/DL (ref 7–21)
BUN BLD-MCNC: 5 MG/DL (ref 7–21)
BUN/CREAT SERPL: 8.5 (ref 7–25)
BUN/CREAT SERPL: 8.9 (ref 7–25)
CALCIUM SPEC-SCNC: 8.1 MG/DL (ref 8.4–10.2)
CALCIUM SPEC-SCNC: 8.2 MG/DL (ref 8.4–10.2)
CHLORIDE SERPL-SCNC: 99 MMOL/L (ref 95–110)
CHLORIDE SERPL-SCNC: 99 MMOL/L (ref 95–110)
CO2 SERPL-SCNC: 24 MMOL/L (ref 22–31)
CO2 SERPL-SCNC: 24 MMOL/L (ref 22–31)
CREAT BLD-MCNC: 0.56 MG/DL (ref 0.7–1.3)
CREAT BLD-MCNC: 0.59 MG/DL (ref 0.7–1.3)
DEPRECATED RDW RBC AUTO: 39.8 FL (ref 35.1–43.9)
EOSINOPHIL # BLD AUTO: 0.05 10*3/MM3 (ref 0–0.7)
EOSINOPHIL NFR BLD AUTO: 1 % (ref 0–7)
ERYTHROCYTE [DISTWIDTH] IN BLOOD BY AUTOMATED COUNT: 11.5 % (ref 11.5–14.5)
GFR SERPL CREATININE-BSD FRML MDRD: 139 ML/MIN/1.73 (ref 49–113)
GFR SERPL CREATININE-BSD FRML MDRD: 148 ML/MIN/1.73 (ref 60–113)
GFR SERPL CREATININE-BSD FRML MDRD: 169 ML/MIN/1.73 (ref 49–113)
GFR SERPL CREATININE-BSD FRML MDRD: 179 ML/MIN/1.73 (ref 49–113)
GLOBULIN UR ELPH-MCNC: 2.2 GM/DL (ref 2.3–3.5)
GLUCOSE BLD-MCNC: 88 MG/DL (ref 60–100)
GLUCOSE BLD-MCNC: 93 MG/DL (ref 60–100)
HCT VFR BLD AUTO: 30.5 % (ref 39–49)
HGB BLD-MCNC: 11.4 G/DL (ref 13.7–17.3)
IMM GRANULOCYTES # BLD: 0.04 10*3/MM3 (ref 0–0.02)
IMM GRANULOCYTES NFR BLD: 0.8 % (ref 0–0.5)
LYMPHOCYTES # BLD AUTO: 1.63 10*3/MM3 (ref 0.6–4.2)
LYMPHOCYTES NFR BLD AUTO: 31.7 % (ref 10–50)
MCH RBC QN AUTO: 35.5 PG (ref 26.5–34)
MCHC RBC AUTO-ENTMCNC: 37.4 G/DL (ref 31.5–36.3)
MCV RBC AUTO: 95 FL (ref 80–98)
MONOCYTES # BLD AUTO: 0.53 10*3/MM3 (ref 0–0.9)
MONOCYTES NFR BLD AUTO: 10.3 % (ref 0–12)
NEUTROPHILS # BLD AUTO: 2.87 10*3/MM3 (ref 2–8.6)
NEUTROPHILS NFR BLD AUTO: 55.6 % (ref 37–80)
PLATELET # BLD AUTO: 182 10*3/MM3 (ref 150–450)
PMV BLD AUTO: 9.9 FL (ref 8–12)
POTASSIUM BLD-SCNC: 3.2 MMOL/L (ref 3.5–5.1)
POTASSIUM BLD-SCNC: 3.8 MMOL/L (ref 3.5–5.1)
PROT SERPL-MCNC: 4.8 G/DL (ref 6.3–8.6)
RBC # BLD AUTO: 3.21 10*6/MM3 (ref 4.37–5.74)
SODIUM BLD-SCNC: 130 MMOL/L (ref 137–145)
SODIUM BLD-SCNC: 132 MMOL/L (ref 137–145)
WBC NRBC COR # BLD: 5.15 10*3/MM3 (ref 3.2–9.8)

## 2018-04-05 PROCEDURE — 85025 COMPLETE CBC W/AUTO DIFF WBC: CPT | Performed by: FAMILY MEDICINE

## 2018-04-05 PROCEDURE — 99232 SBSQ HOSP IP/OBS MODERATE 35: CPT | Performed by: STUDENT IN AN ORGANIZED HEALTH CARE EDUCATION/TRAINING PROGRAM

## 2018-04-05 PROCEDURE — 80053 COMPREHEN METABOLIC PANEL: CPT | Performed by: STUDENT IN AN ORGANIZED HEALTH CARE EDUCATION/TRAINING PROGRAM

## 2018-04-05 RX ORDER — SODIUM CHLORIDE 1000 MG
1 TABLET, SOLUBLE MISCELLANEOUS
Status: DISCONTINUED | OUTPATIENT
Start: 2018-04-05 | End: 2018-04-06 | Stop reason: HOSPADM

## 2018-04-05 RX ORDER — LISINOPRIL 5 MG/1
5 TABLET ORAL
Status: DISCONTINUED | OUTPATIENT
Start: 2018-04-06 | End: 2018-04-06 | Stop reason: HOSPADM

## 2018-04-05 RX ORDER — POTASSIUM CHLORIDE 750 MG/1
40 CAPSULE, EXTENDED RELEASE ORAL ONCE
Status: COMPLETED | OUTPATIENT
Start: 2018-04-05 | End: 2018-04-05

## 2018-04-05 RX ADMIN — ACETAMINOPHEN 650 MG: 325 TABLET ORAL at 02:15

## 2018-04-05 RX ADMIN — SODIUM CHLORIDE TAB 1 GM 1 G: 1 TAB at 12:40

## 2018-04-05 RX ADMIN — POTASSIUM CHLORIDE 40 MEQ: 750 CAPSULE, EXTENDED RELEASE ORAL at 08:48

## 2018-04-05 RX ADMIN — SODIUM CHLORIDE TAB 1 GM 1 G: 1 TAB at 17:03

## 2018-04-05 RX ADMIN — TAMSULOSIN HYDROCHLORIDE 0.4 MG: 0.4 CAPSULE ORAL at 08:48

## 2018-04-05 RX ADMIN — SODIUM CHLORIDE 125 ML/HR: 9 INJECTION, SOLUTION INTRAVENOUS at 08:49

## 2018-04-05 RX ADMIN — SODIUM CHLORIDE 125 ML/HR: 9 INJECTION, SOLUTION INTRAVENOUS at 08:47

## 2018-04-05 RX ADMIN — Medication 10 ML: at 08:49

## 2018-04-05 RX ADMIN — PANTOPRAZOLE SODIUM 40 MG: 40 TABLET, DELAYED RELEASE ORAL at 17:03

## 2018-04-05 RX ADMIN — PANTOPRAZOLE SODIUM 40 MG: 40 TABLET, DELAYED RELEASE ORAL at 08:48

## 2018-04-05 NOTE — CONSULTS
Adult Nutrition  Assessment    Patient Name:  Barron Ackerman  YOB: 1955  MRN: [id number]  Admit Date:  4/2/2018    Assessment Date:  4/5/2018    Comments:  Pt advanced to regular diet and has good appetite.  PO intakes %.  Pt has been requesting snacks between meals.  N/V and GI distress have resolved.  Wt up since admit.  RD will monitor.          Adult Nutrition Assessment     Row Name 04/05/18 1223       Nutrition Prescription PO    Current PO Diet Regular       PO Evaluation    Number of Days PO Intake Evaluated 2 days    % PO Intake     Row Name 04/05/18 1222       Labs/Procedures/Meds    Lab Results Reviewed reviewed, pertinent    Lab Results Comments Na+ 130    Row Name 04/05/18 1221       Reason for Assessment    Reason For Assessment follow-up protocol       Nutrition/Diet History    Typical Food/Fluid Intake Pt reports good appetite and is tolerating regular diet and requesting snacks between meals.  N/V resolved.          Electronically signed by:  Magaly Agee RD  04/05/18 12:58 PM

## 2018-04-05 NOTE — PLAN OF CARE
Problem: Patient Care Overview  Goal: Plan of Care Review  Outcome: Ongoing (interventions implemented as appropriate)   04/05/18 1301   Coping/Psychosocial   Plan of Care Reviewed With patient;family   Plan of Care Review   Progress improving   OTHER   Outcome Summary Pt has good appetite. PO intakes % and requests snacks between meals. GI distress resolved.

## 2018-04-05 NOTE — PAYOR COMM NOTE
"Barron Ackerman (62 y.o. Male)     Date of Birth Social Security Number Address Home Phone MRN    1955  [address] [place] [id number] [phone] [id number]    Ep[employer] Marital Status          S[employer]ianist        Admission Date Admission Type Admitting Provider Attending Provider Department, Room/Bed    4/2/18 Emergency Shay Brewster MD Holler, Benjamin, MD [address], 421/1    Discharge Date Discharge Disposition Discharge Destination                       Attending Provider:  Janusz Patrick MD    Allergies:  No Known Allergies    Isolation:  None   Infection:  None   Code Status:  FULL    Ht:  177.8 cm (70\")   Wt:  80.1 kg (176 lb 9.6 oz)    Admission Cmt:  None   Principal Problem:  Hyponatremia [E87.1] More...                 Active Insurance as of 4/2/2018     Primary Coverage     Payor Plan Insurance Group Employer/Plan Group    PASSPORT PASSPORT MEDICAID     Payor Plan Address Payor Plan Phone Number Effective From Effective To    [address] [phone] 1/1/2016     [place] [address]       Subscriber Name Subscriber Birth Date Member ID       BARRON ACKERMAN 1955 [id number]                 Emergency Contacts      (Rel.) Home Phone Work Phone Mobile Phone    April Ackerman (Spouse) [phone] -- [phone]        [id number]    Insurance Information                PASSPORT/PASSPORT Phone: [phone]    Subscriber: Barron Ackerman Subscriber#: [id number]    Group#: MEDICAID Precert#:                 ICU Vital Signs     Row Name 04/05/18 0845 04/05/18 0736 04/05/18 0337 04/05/18 0036 04/04/18 2052       Height and Weight    Weight  --  -- 80.1 kg (176 lb 9.6 oz)  --  --    Weight Method  --  -- Bed scale  --  --       Vitals    Temp 98.7 °F (37.1 °C)  -- 96.7 °F (35.9 °C)  --  --    Temp src Oral  -- Temporal Artery   --  --    Pulse 64 67 64 65  --    Heart Rate Source Monitor Monitor Monitor Monitor  --    Resp 18 -- 18  --  --    " Resp Rate Source Visual  -- Visual  --  --    /58  -- 116/70  --  --    BP Location Left arm  -- Left arm  --  --    BP Method Manual  -- Automatic  --  --    Patient Position Lying  -- Lying  --  --       Oxygen Therapy    SpO2 98 %  -- 98 %  --  --    Pulse Oximetry Type Intermittent  -- Intermittent  --  --    Device (Oxygen Therapy) room air  -- room air  -- room air    Row Name 04/04/18 1900 04/04/18 1531 04/04/18 1519 04/04/18 1300          Vitals    Temp 98 °F (36.7 °C) 97.7 °F (36.5 °C)  --  --     Temp src Temporal Artery  Temporal Artery   --  --     Pulse 78 76 72  --     Heart Rate Source Monitor Monitor Monitor  --     Resp 18 18  --  --     Resp Rate Source Visual Visual  --  --     BP (!)  80/48   pt states he feels fine; instructed to call if any changes 101/58  --  --     BP Location Left arm Left arm  --  --     BP Method Manual Automatic  --  --     Patient Position Lying Lying  --  --        Oxygen Therapy    SpO2 98 % 99 %  --  --     Pulse Oximetry Type  -- Intermittent  --  --     Device (Oxygen Therapy) room air  --  -- room air         Hospital Medications (active)       Dose Frequency Start End    acetaminophen (TYLENOL) tablet 650 mg 650 mg Every 4 Hours PRN 4/2/2018     Sig - Route: Take 2 tablets by mouth Every 4 (Four) Hours As Needed for Headache or Fever (temperature greater than 101.5 F). - Oral    Cosign for Ordering: Accepted by Shay Brewster MD on 4/2/2018  7:06 PM    hydrALAZINE (APRESOLINE) injection 20 mg 20 mg Every 4 Hours PRN 4/2/2018     Sig - Route: Infuse 1 mL into a venous catheter Every 4 (Four) Hours As Needed for High Blood Pressure. - Intravenous    Cosign for Ordering: Accepted by Shay Brewster MD on 4/2/2018  7:06 PM    lisinopril (PRINIVIL,ZESTRIL) tablet 5 mg 5 mg Every 24 Hours Scheduled 4/6/2018     Sig - Route: Take 1 tablet by mouth Daily. - Oral    ondansetron (ZOFRAN) tablet 4 mg 4 mg Every 6 Hours PRN 4/2/2018     Sig - Route: Take 1 tablet by  mouth Every 6 (Six) Hours As Needed for Nausea or Vomiting. - Oral    Cosign for Ordering: Accepted by Shay Brewster MD on 4/2/2018  7:06 PM    pantoprazole (PROTONIX) EC tablet 40 mg 40 mg 2 Times Daily Before Meals 4/2/2018     Sig - Route: Take 1 tablet by mouth 2 (Two) Times a Day Before Meals. - Oral    Cosign for Ordering: Accepted by Shay Brewster MD on 4/2/2018  7:06 PM    potassium chloride (MICRO-K) CR capsule 40 mEq 40 mEq Once 4/5/2018 4/5/2018    Sig - Route: Take 4 capsules by mouth 1 (One) Time. - Oral    Cosign for Ordering: Accepted by Shay Brewster MD on 4/5/2018  7:37 AM    sodium chloride 0.9 % flush 1-10 mL 1-10 mL As Needed 4/2/2018     Sig - Route: Infuse 1-10 mL into a venous catheter As Needed for Line Care. - Intravenous    Cosign for Ordering: Accepted by Shay Brewster MD on 4/2/2018  7:06 PM    sodium chloride tablet 1 g 1 g Once 4/4/2018 4/4/2018    Sig - Route: Take 1 tablet by mouth 1 (One) Time. - Oral    sodium chloride tablet 1 g 1 g 3 Times Daily With Meals 4/5/2018     Sig - Route: Take 1 tablet by mouth 3 (Three) Times a Day With Meals. - Oral    tamsulosin (FLOMAX) 24 hr capsule 0.4 mg 0.4 mg Every 24 Hours 4/3/2018     Sig - Route: Take 1 capsule by mouth Daily. - Oral    Cosign for Ordering: Accepted by Shay Brewster MD on 4/2/2018  7:06 PM    lisinopril (PRINIVIL,ZESTRIL) tablet 10 mg (Discontinued) 10 mg Every 24 Hours Scheduled 4/3/2018 4/5/2018    Sig - Route: Take 1 tablet by mouth Daily. - Oral    sodium chloride 0.9 % infusion (Discontinued) 125 mL/hr Continuous 4/2/2018 4/5/2018    Sig - Route: Infuse 125 mL/hr into a venous catheter Continuous. - Intravenous            Lab Results (last 24 hours)     Procedure Component Value Units Date/Time    Comprehensive Metabolic Panel [[id number]]  (Abnormal) Collected:  04/05/18 0514    Specimen:  Blood Updated:  04/05/18 0619     Glucose 88 mg/dL      BUN 5 (L) mg/dL      Creatinine 0.56 (L) mg/dL      Sodium 130 (L)  mmol/L      Potassium 3.2 (L) mmol/L      Chloride 99 mmol/L      CO2 24.0 mmol/L      Calcium 8.2 (L) mg/dL      Total Protein 4.8 (L) g/dL      Albumin 2.60 (L) g/dL      ALT (SGPT) 75 (H) U/L      AST (SGOT) 29 U/L      Alkaline Phosphatase 44 U/L      Total Bilirubin 0.3 mg/dL      eGFR Non African Amer 148 mL/min/1.73      eGFR  African Amer 179 (H) mL/min/1.73      Globulin 2.2 (L) gm/dL      A/G Ratio 1.2 g/dL      BUN/Creatinine Ratio 8.9     Anion Gap 7.0 mmol/L     CBC & Differential [[id number]] Collected:  04/05/18 0514    Specimen:  Blood Updated:  04/05/18 0609    Narrative:       The following orders were created for panel order CBC & Differential.  Procedure                               Abnormality         Status                     ---------                               -----------         ------                     Scan Slide[[id number]]                                                                  CBC Auto Differential[[id number]]        Abnormal            Final result                 Please view results for these tests on the individual orders.    CBC Auto Differential [[id number]]  (Abnormal) Collected:  04/05/18 0514    Specimen:  Blood Updated:  04/05/18 0607     WBC 5.15 10*3/mm3      RBC 3.21 (L) 10*6/mm3      Hemoglobin 11.4 (L) g/dL      Hematocrit 30.5 (L) %      MCV 95.0 fL      MCH 35.5 (H) pg      MCHC 37.4 (H) g/dL      RDW 11.5 %      RDW-SD 39.8 fl      MPV 9.9 fL      Platelets 182 10*3/mm3      Neutrophil % 55.6 %      Lymphocyte % 31.7 %      Monocyte % 10.3 %      Eosinophil % 1.0 %      Basophil % 0.6 %      Immature Grans % 0.8 (H) %      Neutrophils, Absolute 2.87 10*3/mm3      Lymphocytes, Absolute 1.63 10*3/mm3      Monocytes, Absolute 0.53 10*3/mm3      Eosinophils, Absolute 0.05 10*3/mm3      Basophils, Absolute 0.03 10*3/mm3      Immature Grans, Absolute 0.04 (H) 10*3/mm3     Basic Metabolic Panel [[id number]]  (Abnormal) Collected:  04/04/18 1431    Specimen:  Blood  "Updated:  18 1457     Glucose 139 (H) mg/dL      BUN 5 (L) mg/dL      Creatinine 0.49 (L) mg/dL      Sodium 127 (L) mmol/L      Potassium 3.6 mmol/L      Chloride 97 mmol/L      CO2 24.0 mmol/L      Calcium 8.4 mg/dL      eGFR   Amer 209 (H) mL/min/1.73      eGFR Non African Amer >150 mL/min/1.73      BUN/Creatinine Ratio 10.2     Anion Gap 6.0 mmol/L            Physician Progress Notes (last 24 hours) (Notes from 2018 12:08 PM through 2018 12:08 PM)      ERNST Lara at 2018  8:41 AM     Attestation signed by Gilma Britt MD at 2018 11:11 AM    I have reviewed the documentation above and agree with Edwin Pizarro, has low bp today and will lower lisinopril to 5mg or likely he will not need any anti htn. Can be discharged from renal standpoint if all agrees and will see him in office in 2-3 weeks                  [facility]  [address] [place] [address]. [address]  T  [phone]  F  [phone]     Progress Note          PATIENT  DEMOGRAPHICS   PATIENT NAME: Barron Ackerman                      PHYSICIAN: ERNST Lara  : 1955  MRN: [id number]   LOS: 3 days    Patient Care Team:  ERNST Celestin as PCP - General  Subjective   SUBJECTIVE   Feels well no soa, no n/v/d, improved PO intake.       Objective   OBJECTIVE   Vital Signs  Temp:  [96.7 °F (35.9 °C)-98 °F (36.7 °C)] 96.7 °F (35.9 °C)  Heart Rate:  [64-78] 67  Resp:  [18] 18  BP: ()/(48-70) 116/70    Flowsheet Rows    Flowsheet Row First Filed Value   Admission Height 177.8 cm (70\") Documented at 2018 1423   Admission Weight 75.8 kg (167 lb) Documented at 2018 1423           I/O last 3 completed shifts:  In: 2380 [P.O.:680; I.V.:1700]  Out: -     PHYSICAL EXAM    Physical Exam   Constitutional: He is oriented to person, place, and time. He appears well-developed.   HENT:   Head: Normocephalic.   Eyes: Pupils are equal, round, and reactive to light.   Cardiovascular: " Normal rate, regular rhythm and normal heart sounds.    Pulmonary/Chest: Effort normal and breath sounds normal.   Abdominal: Soft. Bowel sounds are normal.   Neurological: He is alert and oriented to person, place, and time.       RESULTS   Results Review:      Results from last 7 days  Lab Units 04/05/18  0514 04/04/18  1431 04/04/18  0550  04/03/18  0822   SODIUM mmol/L 130* 127* 122*  < > 120*   POTASSIUM mmol/L 3.2* 3.6 3.2*  --  3.4*   CHLORIDE mmol/L 99 97 92*  --  84*   CO2 mmol/L 24.0 24.0 24.0  --  23.0   BUN mg/dL 5* 5* 4*  --  8   CREATININE mg/dL 0.56* 0.49* 0.52*  --  0.57*   CALCIUM mg/dL 8.2* 8.4 8.2*  --  8.4   BILIRUBIN mg/dL 0.3  --  0.7  --  1.0   ALK PHOS U/L 44  --  47  --  54   ALT (SGPT) U/L 75*  --  95*  --  136*   AST (SGOT) U/L 29  --  50  --  85*   GLUCOSE mg/dL 88 139* 78  --  73   < > = values in this interval not displayed.    Estimated Creatinine Clearance: 155 mL/min (by C-G formula based on SCr of 0.56 mg/dL (L)).      Results from last 7 days  Lab Units 04/03/18  0822   MAGNESIUM mg/dL 2.0   PHOSPHORUS mg/dL 2.6         Results from last 7 days  Lab Units 04/02/18  1804   URIC ACID mg/dL 3.2         Results from last 7 days  Lab Units 04/05/18  0514 04/04/18  0550 04/03/18  0822 04/02/18  1431   WBC 10*3/mm3 5.15 5.61 6.00 6.18   HEMOGLOBIN g/dL 11.4* 12.3* 13.3* 14.8   PLATELETS 10*3/mm3 182 187 195 197         Results from last 7 days  Lab Units 04/02/18  1431   INR  0.89         Imaging Results (last 24 hours)     ** No results found for the last 24 hours. **           MEDICATIONS      lisinopril 10 mg Oral Q24H   pantoprazole 40 mg Oral BID AC   potassium chloride 40 mEq Oral Once   tamsulosin 0.4 mg Oral Q24H       sodium chloride 125 mL/hr Last Rate: 125 mL/hr (04/04/18 1918)       Assessment/Plan   ASSESSMENT / PLAN    Principal Problem:    Hyponatremia  Active Problems:    Benign prostatic hyperplasia with lower urinary tract symptoms    Gastroesophageal reflux disease     Hypertension    Hypokalemia    Other dysphagia    1. Hyponatremia- Patient urine osmolality is not extremely high and appears lower than serum osmolality. I believe he is hypovolemic in the setting of recent illness, vomiting, diarrhea, and also superimposed HCTZ in the background leading to more of a hypovolemic picture.  He also has weight loss and prior history of smoking. CXR is negative. Cortisol is normal. TSH is low normal.     -Sodium improved to 130    -D/C IVF    -Will start on salt tabs     2. Hypertension- Keep lisinopril, hctz is on hold    3. Hypokalemia - Replaced, now low again. Due to receive 40 mEq PO this morning. Pt has received IV potassium, will avoid as pt is receiving free water with potassium IVPB.              This document has been electronically signed by ERNST Lara on 2018 8:41 AM           Electronically signed by Gilma Britt MD at 2018 11:11 AM     Janusz Patrick MD at 2018  5:51 AM              FAMILY MEDICINE DAILY PROGRESS NOTE    NAME: Barron Ackerman  : 1955  MRN: [id number]      LOS: 3 days     PROVIDER OF SERVICE: Janusz Patrick MD    Chief Complaint: Hyponatremia    Subjective:     Interval History:  History taken from: patient chart RN  Patient Complaints: fatigue    No history of events last night.  Patient feels great. Would like to go home.  No nausea, vomiting, diarrhea. This morning pt denies having any problems swallowing now or in the past. He has no other concerns/complaints that he would like to discuss.    Review of Systems:   Review of Systems   Constitutional: Negative for activity change, appetite change, chills, diaphoresis, fatigue and fever.   HENT: Positive for congestion. Negative for ear pain, rhinorrhea, sneezing, sore throat and trouble swallowing.    Eyes: Negative for pain, redness, itching and visual disturbance.   Respiratory: Negative for cough, choking, chest tightness, shortness of breath, wheezing and stridor.     Cardiovascular: Negative for chest pain, palpitations and leg swelling.   Gastrointestinal: Negative for abdominal distention, abdominal pain, blood in stool, constipation, diarrhea, nausea, rectal pain and vomiting.   Genitourinary: Negative for difficulty urinating, dysuria, flank pain and frequency.   Skin: Negative for color change and rash.   Neurological: Negative for dizziness, seizures, syncope, weakness, light-headedness, numbness and headaches.   Psychiatric/Behavioral: Negative for agitation, confusion, decreased concentration and sleep disturbance. The patient is not nervous/anxious.    All other systems reviewed and are negative.      Objective:     Vital Signs  Temp:  [96.7 °F (35.9 °C)-98 °F (36.7 °C)] 96.7 °F (35.9 °C)  Heart Rate:  [64-78] 64  Resp:  [18] 18  BP: ()/(48-70) 116/70  Body mass index is 25.34 kg/m².    Physical Exam  Physical Exam   Constitutional: He is oriented to person, place, and time. He appears well-developed and well-nourished.  Non-toxic appearance. He does not have a sickly appearance. He does not appear ill. No distress.   HENT:   Head: Normocephalic and atraumatic.   Right Ear: External ear normal. No lacerations. No swelling or tenderness.   Left Ear: External ear normal. No lacerations. No swelling or tenderness.   Nose: Nose normal.   Mouth/Throat: Uvula is midline, oropharynx is clear and moist and mucous membranes are normal.   Eyes: Conjunctivae, EOM and lids are normal. Pupils are equal, round, and reactive to light. No scleral icterus.   Neck: No tracheal deviation present. No thyromegaly present.   Cardiovascular: Normal rate, regular rhythm, normal heart sounds and intact distal pulses.  Exam reveals no gallop, no distant heart sounds and no friction rub.    No murmur heard.  Pulses:       Carotid pulses are 2+ on the right side, and 2+ on the left side.       Radial pulses are 2+ on the right side, and 2+ on the left side.        Dorsalis pedis pulses are  2+ on the right side, and 2+ on the left side.        Posterior tibial pulses are 2+ on the right side, and 2+ on the left side.   Pulmonary/Chest: Effort normal. No accessory muscle usage or stridor. No respiratory distress. He has no decreased breath sounds. He has no wheezes. He has no rhonchi. He has no rales. He exhibits no tenderness.   Abdominal: Soft. Bowel sounds are normal. He exhibits no shifting dullness, no distension and no ascites. There is no tenderness. There is no rigidity, no rebound and no guarding.   Musculoskeletal:        Right lower leg: He exhibits no swelling.        Left lower leg: He exhibits no swelling.        Right foot: There is no swelling.        Left foot: There is no swelling.     Vascular Status -  His right foot exhibits normal foot vasculature  and no edema. His left foot exhibits normal foot vasculature  and no edema.  Lymphadenopathy:     He has no cervical adenopathy.   Neurological: He is alert and oriented to person, place, and time. He has normal strength. No cranial nerve deficit or sensory deficit. He exhibits normal muscle tone. GCS eye subscore is 4. GCS verbal subscore is 5. GCS motor subscore is 6.   Skin: Skin is warm and dry. No rash noted. He is not diaphoretic. No erythema. No pallor.   Psychiatric: He has a normal mood and affect. His speech is normal.   Nursing note and vitals reviewed.      Medication Review    Current Facility-Administered Medications:   •  acetaminophen (TYLENOL) tablet 650 mg, 650 mg, Oral, Q4H PRN, Janusz Patrick MD, 650 mg at 04/05/18 0215  •  hydrALAZINE (APRESOLINE) injection 20 mg, 20 mg, Intravenous, Q4H PRN, Janusz Patrick MD  •  lisinopril (PRINIVIL,ZESTRIL) tablet 10 mg, 10 mg, Oral, Q24H, Gilma Britt MD, 10 mg at 04/04/18 0822  •  ondansetron (ZOFRAN) tablet 4 mg, 4 mg, Oral, Q6H PRN, Janusz Patrick MD  •  pantoprazole (PROTONIX) EC tablet 40 mg, 40 mg, Oral, BID AC, Janusz Patrick MD, 40 mg at 04/04/18 8507  •   potassium chloride (MICRO-K) CR capsule 40 mEq, 40 mEq, Oral, Once, Janusz Patrick MD  •  sodium chloride 0.9 % flush 1-10 mL, 1-10 mL, Intravenous, PRN, Janusz Patrick MD  •  sodium chloride 0.9 % infusion, 125 mL/hr, Intravenous, Continuous, ERNST Lara, Last Rate: 125 mL/hr at 04/04/18 1918, 125 mL/hr at 04/04/18 1918  •  tamsulosin (FLOMAX) 24 hr capsule 0.4 mg, 0.4 mg, Oral, Q24H, Janusz Patrick MD, 0.4 mg at 04/04/18 0822     Diagnostic Data    Lab Results (last 24 hours)     Procedure Component Value Units Date/Time    Comprehensive Metabolic Panel [[id number]]  (Abnormal) Collected:  04/05/18 0514    Specimen:  Blood Updated:  04/05/18 0619     Glucose 88 mg/dL      BUN 5 (L) mg/dL      Creatinine 0.56 (L) mg/dL      Sodium 130 (L) mmol/L      Potassium 3.2 (L) mmol/L      Chloride 99 mmol/L      CO2 24.0 mmol/L      Calcium 8.2 (L) mg/dL      Total Protein 4.8 (L) g/dL      Albumin 2.60 (L) g/dL      ALT (SGPT) 75 (H) U/L      AST (SGOT) 29 U/L      Alkaline Phosphatase 44 U/L      Total Bilirubin 0.3 mg/dL      eGFR Non African Amer 148 mL/min/1.73      eGFR  African Amer 179 (H) mL/min/1.73      Globulin 2.2 (L) gm/dL      A/G Ratio 1.2 g/dL      BUN/Creatinine Ratio 8.9     Anion Gap 7.0 mmol/L     CBC & Differential [[id number]] Collected:  04/05/18 0514    Specimen:  Blood Updated:  04/05/18 0609    Narrative:       The following orders were created for panel order CBC & Differential.  Procedure                               Abnormality         Status                     ---------                               -----------         ------                     Scan Slide[[id number]]                                                                  CBC Auto Differential[[id number]]        Abnormal            Final result                 Please view results for these tests on the individual orders.    CBC Auto Differential [[id number]]  (Abnormal) Collected:  04/05/18 0514    Specimen:  Blood Updated:   04/05/18 0607     WBC 5.15 10*3/mm3      RBC 3.21 (L) 10*6/mm3      Hemoglobin 11.4 (L) g/dL      Hematocrit 30.5 (L) %      MCV 95.0 fL      MCH 35.5 (H) pg      MCHC 37.4 (H) g/dL      RDW 11.5 %      RDW-SD 39.8 fl      MPV 9.9 fL      Platelets 182 10*3/mm3      Neutrophil % 55.6 %      Lymphocyte % 31.7 %      Monocyte % 10.3 %      Eosinophil % 1.0 %      Basophil % 0.6 %      Immature Grans % 0.8 (H) %      Neutrophils, Absolute 2.87 10*3/mm3      Lymphocytes, Absolute 1.63 10*3/mm3      Monocytes, Absolute 0.53 10*3/mm3      Eosinophils, Absolute 0.05 10*3/mm3      Basophils, Absolute 0.03 10*3/mm3      Immature Grans, Absolute 0.04 (H) 10*3/mm3     Basic Metabolic Panel [[id number]]  (Abnormal) Collected:  04/04/18 1431    Specimen:  Blood Updated:  04/04/18 1457     Glucose 139 (H) mg/dL      BUN 5 (L) mg/dL      Creatinine 0.49 (L) mg/dL      Sodium 127 (L) mmol/L      Potassium 3.6 mmol/L      Chloride 97 mmol/L      CO2 24.0 mmol/L      Calcium 8.4 mg/dL      eGFR   Amer 209 (H) mL/min/1.73      eGFR Non African Amer >150 mL/min/1.73      BUN/Creatinine Ratio 10.2     Anion Gap 6.0 mmol/L     Comprehensive Metabolic Panel [[id number]]  (Abnormal) Collected:  04/04/18 0550    Specimen:  Blood Updated:  04/04/18 0657     Glucose 78 mg/dL      BUN 4 (L) mg/dL      Creatinine 0.52 (L) mg/dL      Sodium 122 (L) mmol/L      Potassium 3.2 (L) mmol/L      Chloride 92 (L) mmol/L      CO2 24.0 mmol/L      Calcium 8.2 (L) mg/dL      Total Protein 5.3 (L) g/dL      Albumin 3.00 (L) g/dL      ALT (SGPT) 95 (H) U/L      AST (SGOT) 50 U/L      Alkaline Phosphatase 47 U/L      Total Bilirubin 0.7 mg/dL      eGFR Non African Amer >150 mL/min/1.73      eGFR  African Amer 195 (H) mL/min/1.73      Globulin 2.3 gm/dL      A/G Ratio 1.3 g/dL      BUN/Creatinine Ratio 7.7     Anion Gap 6.0 mmol/L     CBC & Differential [[id number]] Collected:  04/04/18 0550    Specimen:  Blood Updated:  04/04/18 0648    Narrative:        The following orders were created for panel order CBC & Differential.  Procedure                               Abnormality         Status                     ---------                               -----------         ------                     Scan Slide[[id number]]                                                                  CBC Auto Differential[[id number]]        Abnormal            Final result                 Please view results for these tests on the individual orders.        I reviewed the patient's new clinical results.    Assessment/Plan:     Active Hospital Problems (** Indicates Principal Problem)    Diagnosis Date Noted   • **Hyponatremia [E87.1] 04/02/2018     Priority: High     Na 114 today in the ER. 2 week hx of vomiting with no food intake.  -Nephrology consulted. Will correct the sodium 8-10 MG daily point per day.  -ER started 200cc bolus hypertonic saline.  -Urine Na, Osmolality, and creatinine normal.  -Uric acid and TSH and 8 AM cortisol normal.  -Na 130.  -NS [id number]/hr.  -Nephrology correcting abnormalities.     • Hypokalemia [E87.6] 04/02/2018     Priority: High     Potassium 2.3 in the ER on admission.  - EKG unremarkable.  -K 3.2.  -Administer 40meq KCl.  -On the floor on Tele.     • Other dysphagia [R13.19] 04/04/2018     Priority: Medium     -Vague history of occasional difficulty swallowing.  -Given hyponatremia, extensive smoking history, plan to further evaluate once electrolytes corrected.     • Hypertension [I10] 04/02/2018     -Stable  -Continue Lisinopril.     • Gastroesophageal reflux disease [K21.9] 04/02/2018     Stable  -Continue home medication PPI.     • Benign prostatic hyperplasia with lower urinary tract symptoms [N40.1] 04/02/2018     -Continue Flomax from home.        Resolved Hospital Problems    Diagnosis Date Noted Date Resolved   No resolved problems to display.       DVT prophylaxis: SCDs/TEDs  Code status is Full Code    Plan for disposition:Where: home and  When:  electrolytes correct and pt stable.        This document has been electronically signed by Janusz Patrick MD on April 5, 2018 6:40 AM    Electronically signed by Janusz Patrick MD at 4/5/2018  6:41 AM

## 2018-04-05 NOTE — PROGRESS NOTES
"[facility]  [address] [place] [address]. [address]  T - [phone]  F - [phone]     Progress Note          PATIENT  DEMOGRAPHICS   PATIENT NAME: Barron Ackerman                      PHYSICIAN: ERNST Lara  : 1955  MRN: [id number]   LOS: 3 days    Patient Care Team:  ERNST Celestin as PCP - General  Subjective   SUBJECTIVE   Feels well no soa, no n/v/d, improved PO intake.       Objective   OBJECTIVE   Vital Signs  Temp:  [96.7 °F (35.9 °C)-98 °F (36.7 °C)] 96.7 °F (35.9 °C)  Heart Rate:  [64-78] 67  Resp:  [18] 18  BP: ()/(48-70) 116/70    Flowsheet Rows    Flowsheet Row First Filed Value   Admission Height 177.8 cm (70\") Documented at 2018 1423   Admission Weight 75.8 kg (167 lb) Documented at 2018 1423           I/O last 3 completed shifts:  In: 2380 [P.O.:680; I.V.:1700]  Out: -     PHYSICAL EXAM    Physical Exam   Constitutional: He is oriented to person, place, and time. He appears well-developed.   HENT:   Head: Normocephalic.   Eyes: Pupils are equal, round, and reactive to light.   Cardiovascular: Normal rate, regular rhythm and normal heart sounds.    Pulmonary/Chest: Effort normal and breath sounds normal.   Abdominal: Soft. Bowel sounds are normal.   Neurological: He is alert and oriented to person, place, and time.       RESULTS   Results Review:      Results from last 7 days  Lab Units 18  0514 18  1431 18  0550  18  0822   SODIUM mmol/L 130* 127* 122*  < > 120*   POTASSIUM mmol/L 3.2* 3.6 3.2*  --  3.4*   CHLORIDE mmol/L 99 97 92*  --  84*   CO2 mmol/L 24.0 24.0 24.0  --  23.0   BUN mg/dL 5* 5* 4*  --  8   CREATININE mg/dL 0.56* 0.49* 0.52*  --  0.57*   CALCIUM mg/dL 8.2* 8.4 8.2*  --  8.4   BILIRUBIN mg/dL 0.3  --  0.7  --  1.0   ALK PHOS U/L 44  --  47  --  54   ALT (SGPT) U/L 75*  --  95*  --  136*   AST (SGOT) U/L 29  --  50  --  85*   GLUCOSE mg/dL 88 139* 78  --  73   < > = values in this interval not " displayed.    Estimated Creatinine Clearance: 155 mL/min (by C-G formula based on SCr of 0.56 mg/dL (L)).      Results from last 7 days  Lab Units 04/03/18  0822   MAGNESIUM mg/dL 2.0   PHOSPHORUS mg/dL 2.6         Results from last 7 days  Lab Units 04/02/18  1804   URIC ACID mg/dL 3.2         Results from last 7 days  Lab Units 04/05/18  0514 04/04/18  0550 04/03/18  0822 04/02/18  1431   WBC 10*3/mm3 5.15 5.61 6.00 6.18   HEMOGLOBIN g/dL 11.4* 12.3* 13.3* 14.8   PLATELETS 10*3/mm3 182 187 195 197         Results from last 7 days  Lab Units 04/02/18  1431   INR  0.89         Imaging Results (last 24 hours)     ** No results found for the last 24 hours. **           MEDICATIONS      lisinopril 10 mg Oral Q24H   pantoprazole 40 mg Oral BID AC   potassium chloride 40 mEq Oral Once   tamsulosin 0.4 mg Oral Q24H       sodium chloride 125 mL/hr Last Rate: 125 mL/hr (04/04/18 1918)       Assessment/Plan   ASSESSMENT / PLAN    Principal Problem:    Hyponatremia  Active Problems:    Benign prostatic hyperplasia with lower urinary tract symptoms    Gastroesophageal reflux disease    Hypertension    Hypokalemia    Other dysphagia    1. Hyponatremia- Patient urine osmolality is not extremely high and appears lower than serum osmolality. I believe he is hypovolemic in the setting of recent illness, vomiting, diarrhea, and also superimposed HCTZ in the background leading to more of a hypovolemic picture.  He also has weight loss and prior history of smoking. CXR is negative. Cortisol is normal. TSH is low normal.     -Sodium improved to 130    -D/C IVF    -Will start on salt tabs     2. Hypertension- Keep lisinopril, hctz is on hold    3. Hypokalemia - Replaced, now low again. Due to receive 40 mEq PO this morning. Pt has received IV potassium, will avoid as pt is receiving free water with potassium IVPB.              This document has been electronically signed by ERNST Lara on April 5, 2018 8:41 AM

## 2018-04-05 NOTE — PLAN OF CARE
Problem: Patient Care Overview  Goal: Plan of Care Review  Outcome: Ongoing (interventions implemented as appropriate)   04/05/18 7188   Coping/Psychosocial   Plan of Care Reviewed With patient   Plan of Care Review   Progress improving   OTHER   Outcome Summary Pt reports no pain at this time. Sodium tablet given this afternoon. AM sodium 130; will continue to monitor.        Problem: Fall Risk (Adult)  Goal: Identify Related Risk Factors and Signs and Symptoms  Outcome: Outcome(s) achieved Date Met: 04/05/18    Goal: Absence of Fall  Outcome: Outcome(s) achieved Date Met: 04/05/18      Problem: Patient Care Overview  Goal: Plan of Care Review  Outcome: Ongoing (interventions implemented as appropriate)    Goal: Individualization and Mutuality  Outcome: Ongoing (interventions implemented as appropriate)      Problem: Nausea/Vomiting (Adult)  Goal: Identify Related Risk Factors and Signs and Symptoms  Outcome: Outcome(s) achieved Date Met: 04/05/18    Goal: Symptom Relief  Outcome: Ongoing (interventions implemented as appropriate)    Goal: Adequate Hydration  Outcome: Ongoing (interventions implemented as appropriate)

## 2018-04-05 NOTE — PROGRESS NOTES
FAMILY MEDICINE DAILY PROGRESS NOTE    NAME: Barron Ackerman  : 1955  MRN: [id number]      LOS: 3 days     PROVIDER OF SERVICE: Janusz Patrick MD    Chief Complaint: Hyponatremia    Subjective:     Interval History:  History taken from: patient chart RN  Patient Complaints: fatigue    No history of events last night.  Patient feels great. Would like to go home.  No nausea, vomiting, diarrhea. This morning pt denies having any problems swallowing now or in the past. He has no other concerns/complaints that he would like to discuss.    Review of Systems:   Review of Systems   Constitutional: Negative for activity change, appetite change, chills, diaphoresis, fatigue and fever.   HENT: Positive for congestion. Negative for ear pain, rhinorrhea, sneezing, sore throat and trouble swallowing.    Eyes: Negative for pain, redness, itching and visual disturbance.   Respiratory: Negative for cough, choking, chest tightness, shortness of breath, wheezing and stridor.    Cardiovascular: Negative for chest pain, palpitations and leg swelling.   Gastrointestinal: Negative for abdominal distention, abdominal pain, blood in stool, constipation, diarrhea, nausea, rectal pain and vomiting.   Genitourinary: Negative for difficulty urinating, dysuria, flank pain and frequency.   Skin: Negative for color change and rash.   Neurological: Negative for dizziness, seizures, syncope, weakness, light-headedness, numbness and headaches.   Psychiatric/Behavioral: Negative for agitation, confusion, decreased concentration and sleep disturbance. The patient is not nervous/anxious.    All other systems reviewed and are negative.      Objective:     Vital Signs  Temp:  [96.7 °F (35.9 °C)-98 °F (36.7 °C)] 96.7 °F (35.9 °C)  Heart Rate:  [64-78] 64  Resp:  [18] 18  BP: ()/(48-70) 116/70  Body mass index is 25.34 kg/m².    Physical Exam  Physical Exam   Constitutional: He is oriented to person, place, and time. He appears  well-developed and well-nourished.  Non-toxic appearance. He does not have a sickly appearance. He does not appear ill. No distress.   HENT:   Head: Normocephalic and atraumatic.   Right Ear: External ear normal. No lacerations. No swelling or tenderness.   Left Ear: External ear normal. No lacerations. No swelling or tenderness.   Nose: Nose normal.   Mouth/Throat: Uvula is midline, oropharynx is clear and moist and mucous membranes are normal.   Eyes: Conjunctivae, EOM and lids are normal. Pupils are equal, round, and reactive to light. No scleral icterus.   Neck: No tracheal deviation present. No thyromegaly present.   Cardiovascular: Normal rate, regular rhythm, normal heart sounds and intact distal pulses.  Exam reveals no gallop, no distant heart sounds and no friction rub.    No murmur heard.  Pulses:       Carotid pulses are 2+ on the right side, and 2+ on the left side.       Radial pulses are 2+ on the right side, and 2+ on the left side.        Dorsalis pedis pulses are 2+ on the right side, and 2+ on the left side.        Posterior tibial pulses are 2+ on the right side, and 2+ on the left side.   Pulmonary/Chest: Effort normal. No accessory muscle usage or stridor. No respiratory distress. He has no decreased breath sounds. He has no wheezes. He has no rhonchi. He has no rales. He exhibits no tenderness.   Abdominal: Soft. Bowel sounds are normal. He exhibits no shifting dullness, no distension and no ascites. There is no tenderness. There is no rigidity, no rebound and no guarding.   Musculoskeletal:        Right lower leg: He exhibits no swelling.        Left lower leg: He exhibits no swelling.        Right foot: There is no swelling.        Left foot: There is no swelling.     Vascular Status -  His right foot exhibits normal foot vasculature  and no edema. His left foot exhibits normal foot vasculature  and no edema.  Lymphadenopathy:     He has no cervical adenopathy.   Neurological: He is alert  and oriented to person, place, and time. He has normal strength. No cranial nerve deficit or sensory deficit. He exhibits normal muscle tone. GCS eye subscore is 4. GCS verbal subscore is 5. GCS motor subscore is 6.   Skin: Skin is warm and dry. No rash noted. He is not diaphoretic. No erythema. No pallor.   Psychiatric: He has a normal mood and affect. His speech is normal.   Nursing note and vitals reviewed.      Medication Review    Current Facility-Administered Medications:   •  acetaminophen (TYLENOL) tablet 650 mg, 650 mg, Oral, Q4H PRN, Janusz Patrick MD, 650 mg at 04/05/18 0215  •  hydrALAZINE (APRESOLINE) injection 20 mg, 20 mg, Intravenous, Q4H PRN, Janusz Patrick MD  •  lisinopril (PRINIVIL,ZESTRIL) tablet 10 mg, 10 mg, Oral, Q24H, Gilma Britt MD, 10 mg at 04/04/18 0822  •  ondansetron (ZOFRAN) tablet 4 mg, 4 mg, Oral, Q6H PRN, Janusz Patrick MD  •  pantoprazole (PROTONIX) EC tablet 40 mg, 40 mg, Oral, BID AC, Janusz Patrick MD, 40 mg at 04/04/18 1737  •  potassium chloride (MICRO-K) CR capsule 40 mEq, 40 mEq, Oral, Once, Janusz Patrick MD  •  sodium chloride 0.9 % flush 1-10 mL, 1-10 mL, Intravenous, PRN, Janusz Patrick MD  •  sodium chloride 0.9 % infusion, 125 mL/hr, Intravenous, Continuous, ERNST Lara, Last Rate: 125 mL/hr at 04/04/18 1918, 125 mL/hr at 04/04/18 1918  •  tamsulosin (FLOMAX) 24 hr capsule 0.4 mg, 0.4 mg, Oral, Q24H, Janusz Patrick MD, 0.4 mg at 04/04/18 0822     Diagnostic Data    Lab Results (last 24 hours)     Procedure Component Value Units Date/Time    Comprehensive Metabolic Panel [[id number]]  (Abnormal) Collected:  04/05/18 0514    Specimen:  Blood Updated:  04/05/18 0619     Glucose 88 mg/dL      BUN 5 (L) mg/dL      Creatinine 0.56 (L) mg/dL      Sodium 130 (L) mmol/L      Potassium 3.2 (L) mmol/L      Chloride 99 mmol/L      CO2 24.0 mmol/L      Calcium 8.2 (L) mg/dL      Total Protein 4.8 (L) g/dL      Albumin 2.60 (L) g/dL      ALT (SGPT) 75 (H) U/L       AST (SGOT) 29 U/L      Alkaline Phosphatase 44 U/L      Total Bilirubin 0.3 mg/dL      eGFR Non African Amer 148 mL/min/1.73      eGFR  African Amer 179 (H) mL/min/1.73      Globulin 2.2 (L) gm/dL      A/G Ratio 1.2 g/dL      BUN/Creatinine Ratio 8.9     Anion Gap 7.0 mmol/L     CBC & Differential [[id number]] Collected:  04/05/18 0514    Specimen:  Blood Updated:  04/05/18 0609    Narrative:       The following orders were created for panel order CBC & Differential.  Procedure                               Abnormality         Status                     ---------                               -----------         ------                     Scan Slide[[id number]]                                                                  CBC Auto Differential[[id number]]        Abnormal            Final result                 Please view results for these tests on the individual orders.    CBC Auto Differential [[id number]]  (Abnormal) Collected:  04/05/18 0514    Specimen:  Blood Updated:  04/05/18 0607     WBC 5.15 10*3/mm3      RBC 3.21 (L) 10*6/mm3      Hemoglobin 11.4 (L) g/dL      Hematocrit 30.5 (L) %      MCV 95.0 fL      MCH 35.5 (H) pg      MCHC 37.4 (H) g/dL      RDW 11.5 %      RDW-SD 39.8 fl      MPV 9.9 fL      Platelets 182 10*3/mm3      Neutrophil % 55.6 %      Lymphocyte % 31.7 %      Monocyte % 10.3 %      Eosinophil % 1.0 %      Basophil % 0.6 %      Immature Grans % 0.8 (H) %      Neutrophils, Absolute 2.87 10*3/mm3      Lymphocytes, Absolute 1.63 10*3/mm3      Monocytes, Absolute 0.53 10*3/mm3      Eosinophils, Absolute 0.05 10*3/mm3      Basophils, Absolute 0.03 10*3/mm3      Immature Grans, Absolute 0.04 (H) 10*3/mm3     Basic Metabolic Panel [[id number]]  (Abnormal) Collected:  04/04/18 1431    Specimen:  Blood Updated:  04/04/18 1457     Glucose 139 (H) mg/dL      BUN 5 (L) mg/dL      Creatinine 0.49 (L) mg/dL      Sodium 127 (L) mmol/L      Potassium 3.6 mmol/L      Chloride 97 mmol/L      CO2 24.0  mmol/L      Calcium 8.4 mg/dL      eGFR   Amer 209 (H) mL/min/1.73      eGFR Non African Amer >150 mL/min/1.73      BUN/Creatinine Ratio 10.2     Anion Gap 6.0 mmol/L     Comprehensive Metabolic Panel [[id number]]  (Abnormal) Collected:  04/04/18 0550    Specimen:  Blood Updated:  04/04/18 0657     Glucose 78 mg/dL      BUN 4 (L) mg/dL      Creatinine 0.52 (L) mg/dL      Sodium 122 (L) mmol/L      Potassium 3.2 (L) mmol/L      Chloride 92 (L) mmol/L      CO2 24.0 mmol/L      Calcium 8.2 (L) mg/dL      Total Protein 5.3 (L) g/dL      Albumin 3.00 (L) g/dL      ALT (SGPT) 95 (H) U/L      AST (SGOT) 50 U/L      Alkaline Phosphatase 47 U/L      Total Bilirubin 0.7 mg/dL      eGFR Non African Amer >150 mL/min/1.73      eGFR  African Amer 195 (H) mL/min/1.73      Globulin 2.3 gm/dL      A/G Ratio 1.3 g/dL      BUN/Creatinine Ratio 7.7     Anion Gap 6.0 mmol/L     CBC & Differential [[id number]] Collected:  04/04/18 0550    Specimen:  Blood Updated:  04/04/18 0648    Narrative:       The following orders were created for panel order CBC & Differential.  Procedure                               Abnormality         Status                     ---------                               -----------         ------                     Scan Slide[[id number]]                                                                  CBC Auto Differential[[id number]]        Abnormal            Final result                 Please view results for these tests on the individual orders.        I reviewed the patient's new clinical results.    Assessment/Plan:     Active Hospital Problems (** Indicates Principal Problem)    Diagnosis Date Noted   • **Hyponatremia [E87.1] 04/02/2018     Priority: High     Na 114 today in the ER. 2 week hx of vomiting with no food intake.  -Nephrology consulted. Will correct the sodium 8-10 MG daily point per day.  -ER started 200cc bolus hypertonic saline.  -Urine Na, Osmolality, and creatinine normal.  -Uric acid  and TSH and 8 AM cortisol normal.  -Na 130.  -NS [id number]/hr.  -Nephrology correcting abnormalities.     • Hypokalemia [E87.6] 04/02/2018     Priority: High     Potassium 2.3 in the ER on admission.  - EKG unremarkable.  -K 3.2.  -Administer 40meq KCl.  -On the floor on Tele.     • Other dysphagia [R13.19] 04/04/2018     Priority: Medium     -Vague history of occasional difficulty swallowing.  -Given hyponatremia, extensive smoking history, plan to further evaluate once electrolytes corrected.     • Hypertension [I10] 04/02/2018     -Stable  -Continue Lisinopril.     • Gastroesophageal reflux disease [K21.9] 04/02/2018     Stable  -Continue home medication PPI.     • Benign prostatic hyperplasia with lower urinary tract symptoms [N40.1] 04/02/2018     -Continue Flomax from home.        Resolved Hospital Problems    Diagnosis Date Noted Date Resolved   No resolved problems to display.       DVT prophylaxis: SCDs/TEDs  Code status is Full Code    Plan for disposition:Where: home and When:  electrolytes correct and pt stable.        This document has been electronically signed by Janusz Patrick MD on April 5, 2018 6:40 AM

## 2018-04-05 NOTE — PLAN OF CARE
Problem: Patient Care Overview  Goal: Plan of Care Review  Outcome: Ongoing (interventions implemented as appropriate)   04/05/18 0303   Coping/Psychosocial   Plan of Care Reviewed With patient   Plan of Care Review   Progress improving   OTHER   Outcome Summary pt has rested comfortably throughout the shift; c/o some pain which was relieved with tylenol; most recent Na 127; vital signs stable; will continue to monitor

## 2018-04-06 VITALS
BODY MASS INDEX: 25.22 KG/M2 | RESPIRATION RATE: 18 BRPM | DIASTOLIC BLOOD PRESSURE: 62 MMHG | OXYGEN SATURATION: 99 % | TEMPERATURE: 96.8 F | HEART RATE: 69 BPM | WEIGHT: 176.2 LBS | SYSTOLIC BLOOD PRESSURE: 112 MMHG | HEIGHT: 70 IN

## 2018-04-06 PROBLEM — E87.6 HYPOKALEMIA: Status: RESOLVED | Noted: 2018-04-02 | Resolved: 2018-04-06

## 2018-04-06 LAB
ALBUMIN SERPL-MCNC: 2.8 G/DL (ref 3.4–4.8)
ALBUMIN/GLOB SERPL: 1.3 G/DL (ref 1.1–1.8)
ALP SERPL-CCNC: 44 U/L (ref 38–126)
ALT SERPL W P-5'-P-CCNC: 68 U/L (ref 21–72)
ANION GAP SERPL CALCULATED.3IONS-SCNC: 8 MMOL/L (ref 5–15)
AST SERPL-CCNC: 29 U/L (ref 17–59)
BASOPHILS # BLD AUTO: 0.03 10*3/MM3 (ref 0–0.2)
BASOPHILS NFR BLD AUTO: 0.6 % (ref 0–2)
BILIRUB SERPL-MCNC: 0.2 MG/DL (ref 0.2–1.3)
BUN BLD-MCNC: 5 MG/DL (ref 7–21)
BUN/CREAT SERPL: 8.3 (ref 7–25)
CALCIUM SPEC-SCNC: 8.3 MG/DL (ref 8.4–10.2)
CHLORIDE SERPL-SCNC: 100 MMOL/L (ref 95–110)
CO2 SERPL-SCNC: 26 MMOL/L (ref 22–31)
CREAT BLD-MCNC: 0.6 MG/DL (ref 0.7–1.3)
DEPRECATED RDW RBC AUTO: 41.5 FL (ref 35.1–43.9)
EOSINOPHIL # BLD AUTO: 0.07 10*3/MM3 (ref 0–0.7)
EOSINOPHIL NFR BLD AUTO: 1.3 % (ref 0–7)
ERYTHROCYTE [DISTWIDTH] IN BLOOD BY AUTOMATED COUNT: 11.8 % (ref 11.5–14.5)
GFR SERPL CREATININE-BSD FRML MDRD: 137 ML/MIN/1.73 (ref 49–113)
GFR SERPL CREATININE-BSD FRML MDRD: 165 ML/MIN/1.73 (ref 49–113)
GLOBULIN UR ELPH-MCNC: 2.2 GM/DL (ref 2.3–3.5)
GLUCOSE BLD-MCNC: 85 MG/DL (ref 60–100)
HCT VFR BLD AUTO: 28.8 % (ref 39–49)
HGB BLD-MCNC: 10.6 G/DL (ref 13.7–17.3)
IMM GRANULOCYTES # BLD: 0.05 10*3/MM3 (ref 0–0.02)
IMM GRANULOCYTES NFR BLD: 0.9 % (ref 0–0.5)
LYMPHOCYTES # BLD AUTO: 1.8 10*3/MM3 (ref 0.6–4.2)
LYMPHOCYTES NFR BLD AUTO: 33.7 % (ref 10–50)
MCH RBC QN AUTO: 35.5 PG (ref 26.5–34)
MCHC RBC AUTO-ENTMCNC: 36.8 G/DL (ref 31.5–36.3)
MCV RBC AUTO: 96.3 FL (ref 80–98)
MONOCYTES # BLD AUTO: 0.49 10*3/MM3 (ref 0–0.9)
MONOCYTES NFR BLD AUTO: 9.2 % (ref 0–12)
NEUTROPHILS # BLD AUTO: 2.9 10*3/MM3 (ref 2–8.6)
NEUTROPHILS NFR BLD AUTO: 54.3 % (ref 37–80)
PLATELET # BLD AUTO: 186 10*3/MM3 (ref 150–450)
PMV BLD AUTO: 9.8 FL (ref 8–12)
POTASSIUM BLD-SCNC: 3.8 MMOL/L (ref 3.5–5.1)
PROT SERPL-MCNC: 5 G/DL (ref 6.3–8.6)
RBC # BLD AUTO: 2.99 10*6/MM3 (ref 4.37–5.74)
SODIUM BLD-SCNC: 134 MMOL/L (ref 137–145)
WBC NRBC COR # BLD: 5.34 10*3/MM3 (ref 3.2–9.8)

## 2018-04-06 PROCEDURE — 85025 COMPLETE CBC W/AUTO DIFF WBC: CPT | Performed by: FAMILY MEDICINE

## 2018-04-06 PROCEDURE — 80053 COMPREHEN METABOLIC PANEL: CPT | Performed by: STUDENT IN AN ORGANIZED HEALTH CARE EDUCATION/TRAINING PROGRAM

## 2018-04-06 PROCEDURE — 99239 HOSP IP/OBS DSCHRG MGMT >30: CPT | Performed by: STUDENT IN AN ORGANIZED HEALTH CARE EDUCATION/TRAINING PROGRAM

## 2018-04-06 RX ORDER — LISINOPRIL 5 MG/1
5 TABLET ORAL DAILY
Qty: 30 TABLET | Refills: 2 | Status: SHIPPED | OUTPATIENT
Start: 2018-04-06 | End: 2018-07-05

## 2018-04-06 RX ORDER — SODIUM CHLORIDE 1000 MG
1 TABLET, SOLUBLE MISCELLANEOUS
Qty: 90 TABLET | Refills: 0 | Status: SHIPPED | OUTPATIENT
Start: 2018-04-06 | End: 2018-05-06

## 2018-04-06 RX ADMIN — LISINOPRIL 5 MG: 5 TABLET ORAL at 09:26

## 2018-04-06 RX ADMIN — PANTOPRAZOLE SODIUM 40 MG: 40 TABLET, DELAYED RELEASE ORAL at 09:26

## 2018-04-06 RX ADMIN — SODIUM CHLORIDE TAB 1 GM 1 G: 1 TAB at 11:45

## 2018-04-06 RX ADMIN — SODIUM CHLORIDE TAB 1 GM 1 G: 1 TAB at 09:26

## 2018-04-06 RX ADMIN — TAMSULOSIN HYDROCHLORIDE 0.4 MG: 0.4 CAPSULE ORAL at 09:26

## 2018-04-06 NOTE — PLAN OF CARE
Problem: Patient Care Overview  Goal: Plan of Care Review  Outcome: Ongoing (interventions implemented as appropriate)   04/06/18 9666   Coping/Psychosocial   Plan of Care Reviewed With patient   Plan of Care Review   Progress improving   OTHER   Outcome Summary pt denies pain and has rested comfortably throughout the shift; vital signs stable; will continue to monitor

## 2018-04-06 NOTE — PROGRESS NOTES
FAMILY MEDICINE DAILY PROGRESS NOTE    NAME: Barron Ackerman  : 1955  MRN: [id number]      LOS: 4 days     PROVIDER OF SERVICE: Janusz Patrick MD    Chief Complaint: Hyponatremia    Subjective:     Interval History:  History taken from: patient chart RN  Patient Complaints: fatigue    No history of events last night.  Patient feels great. Would like to go home.  No nausea, vomiting, diarrhea. This morning pt denies having any problems swallowing now or in the past. He has no other concerns/complaints that he would like to discuss.    Review of Systems:   Review of Systems   Constitutional: Negative for activity change, appetite change, chills, diaphoresis, fatigue and fever.   HENT: Positive for congestion. Negative for ear pain, rhinorrhea, sneezing, sore throat and trouble swallowing.    Eyes: Negative for pain, redness, itching and visual disturbance.   Respiratory: Negative for cough, choking, chest tightness, shortness of breath, wheezing and stridor.    Cardiovascular: Negative for chest pain, palpitations and leg swelling.   Gastrointestinal: Negative for abdominal distention, abdominal pain, blood in stool, constipation, diarrhea, nausea, rectal pain and vomiting.   Genitourinary: Negative for difficulty urinating, dysuria, flank pain and frequency.   Skin: Negative for color change and rash.   Neurological: Negative for dizziness, seizures, syncope, weakness, light-headedness, numbness and headaches.   Psychiatric/Behavioral: Negative for agitation, confusion, decreased concentration and sleep disturbance. The patient is not nervous/anxious.    All other systems reviewed and are negative.      Objective:     Vital Signs  Temp:  [96.6 °F (35.9 °C)-98.7 °F (37.1 °C)] 96.8 °F (36 °C)  Heart Rate:  [64-81] 66  Resp:  [16-18] 18  BP: (102-130)/(58-68) 112/62  Body mass index is 25.28 kg/m².    Physical Exam  Physical Exam   Constitutional: He is oriented to person, place, and time. He appears  well-developed and well-nourished.  Non-toxic appearance. He does not have a sickly appearance. He does not appear ill. No distress.   HENT:   Head: Normocephalic and atraumatic.   Right Ear: External ear normal. No lacerations. No swelling or tenderness.   Left Ear: External ear normal. No lacerations. No swelling or tenderness.   Nose: Nose normal.   Mouth/Throat: Uvula is midline, oropharynx is clear and moist and mucous membranes are normal.   Eyes: Conjunctivae, EOM and lids are normal. Pupils are equal, round, and reactive to light. No scleral icterus.   Neck: No tracheal deviation present. No thyromegaly present.   Cardiovascular: Normal rate, regular rhythm, normal heart sounds and intact distal pulses.  Exam reveals no gallop, no distant heart sounds and no friction rub.    No murmur heard.  Pulses:       Carotid pulses are 2+ on the right side, and 2+ on the left side.       Radial pulses are 2+ on the right side, and 2+ on the left side.        Dorsalis pedis pulses are 2+ on the right side, and 2+ on the left side.        Posterior tibial pulses are 2+ on the right side, and 2+ on the left side.   Pulmonary/Chest: Effort normal. No accessory muscle usage or stridor. No respiratory distress. He has no decreased breath sounds. He has no wheezes. He has no rhonchi. He has no rales. He exhibits no tenderness.   Abdominal: Soft. Bowel sounds are normal. He exhibits no shifting dullness, no distension and no ascites. There is no tenderness. There is no rigidity, no rebound and no guarding.   Musculoskeletal:        Right lower leg: He exhibits no swelling.        Left lower leg: He exhibits no swelling.        Right foot: There is no swelling.        Left foot: There is no swelling.     Vascular Status -  His right foot exhibits normal foot vasculature  and no edema. His left foot exhibits normal foot vasculature  and no edema.  Lymphadenopathy:     He has no cervical adenopathy.   Neurological: He is alert  and oriented to person, place, and time. He has normal strength. No cranial nerve deficit or sensory deficit. He exhibits normal muscle tone. GCS eye subscore is 4. GCS verbal subscore is 5. GCS motor subscore is 6.   Skin: Skin is warm and dry. No rash noted. He is not diaphoretic. No erythema. No pallor.   Psychiatric: He has a normal mood and affect. His speech is normal.   Nursing note and vitals reviewed.      Medication Review    Current Facility-Administered Medications:   •  acetaminophen (TYLENOL) tablet 650 mg, 650 mg, Oral, Q4H PRN, Janusz Patrick MD, 650 mg at 04/05/18 0215  •  hydrALAZINE (APRESOLINE) injection 20 mg, 20 mg, Intravenous, Q4H PRN, Janusz Patrick MD  •  lisinopril (PRINIVIL,ZESTRIL) tablet 5 mg, 5 mg, Oral, Q24H, Gilma Britt MD  •  ondansetron (ZOFRAN) tablet 4 mg, 4 mg, Oral, Q6H PRN, Janusz Patrick MD  •  pantoprazole (PROTONIX) EC tablet 40 mg, 40 mg, Oral, BID AC, Janusz Patrick MD, 40 mg at 04/05/18 1703  •  sodium chloride 0.9 % flush 1-10 mL, 1-10 mL, Intravenous, PRN, Janusz Patrick MD, 10 mL at 04/05/18 0849  •  sodium chloride tablet 1 g, 1 g, Oral, TID With Meals, Edwin Pizarro, APRN, 1 g at 04/05/18 1703  •  tamsulosin (FLOMAX) 24 hr capsule 0.4 mg, 0.4 mg, Oral, Q24H, Janusz Patrick MD, 0.4 mg at 04/05/18 0848     Diagnostic Data    Lab Results (last 24 hours)     Procedure Component Value Units Date/Time    Comprehensive Metabolic Panel [[id number]]  (Abnormal) Collected:  04/06/18 0542    Specimen:  Blood Updated:  04/06/18 0650     Glucose 85 mg/dL      BUN 5 (L) mg/dL      Creatinine 0.60 (L) mg/dL      Sodium 134 (L) mmol/L      Potassium 3.8 mmol/L      Chloride 100 mmol/L      CO2 26.0 mmol/L      Calcium 8.3 (L) mg/dL      Total Protein 5.0 (L) g/dL      Albumin 2.80 (L) g/dL      ALT (SGPT) 68 U/L      AST (SGOT) 29 U/L      Alkaline Phosphatase 44 U/L      Total Bilirubin 0.2 mg/dL      eGFR Non African Amer 137 (H) mL/min/1.73      eGFR   Amer  165 (H) mL/min/1.73      Globulin 2.2 (L) gm/dL      A/G Ratio 1.3 g/dL      BUN/Creatinine Ratio 8.3     Anion Gap 8.0 mmol/L     CBC & Differential [[id number]] Collected:  04/06/18 0542    Specimen:  Blood Updated:  04/06/18 0636    Narrative:       The following orders were created for panel order CBC & Differential.  Procedure                               Abnormality         Status                     ---------                               -----------         ------                     CBC Auto Differential[[id number]]        Abnormal            Final result                 Please view results for these tests on the individual orders.    CBC Auto Differential [[id number]]  (Abnormal) Collected:  04/06/18 0542    Specimen:  Blood Updated:  04/06/18 0636     WBC 5.34 10*3/mm3      RBC 2.99 (L) 10*6/mm3      Hemoglobin 10.6 (L) g/dL      Hematocrit 28.8 (L) %      MCV 96.3 fL      MCH 35.5 (H) pg      MCHC 36.8 (H) g/dL      RDW 11.8 %      RDW-SD 41.5 fl      MPV 9.8 fL      Platelets 186 10*3/mm3      Neutrophil % 54.3 %      Lymphocyte % 33.7 %      Monocyte % 9.2 %      Eosinophil % 1.3 %      Basophil % 0.6 %      Immature Grans % 0.9 (H) %      Neutrophils, Absolute 2.90 10*3/mm3      Lymphocytes, Absolute 1.80 10*3/mm3      Monocytes, Absolute 0.49 10*3/mm3      Eosinophils, Absolute 0.07 10*3/mm3      Basophils, Absolute 0.03 10*3/mm3      Immature Grans, Absolute 0.05 (H) 10*3/mm3     Basic Metabolic Panel [[id number]]  (Abnormal) Collected:  04/05/18 1502    Specimen:  Blood Updated:  04/05/18 1557     Glucose 93 mg/dL      BUN 5 (L) mg/dL      Creatinine 0.59 (L) mg/dL      Sodium 132 (L) mmol/L      Potassium 3.8 mmol/L      Chloride 99 mmol/L      CO2 24.0 mmol/L      Calcium 8.1 (L) mg/dL      eGFR   Amer 169 (H) mL/min/1.73      eGFR Non African Amer 139 (H) mL/min/1.73      BUN/Creatinine Ratio 8.5     Anion Gap 9.0 mmol/L         I reviewed the patient's new clinical  results.    Assessment/Plan:     Active Hospital Problems (** Indicates Principal Problem)    Diagnosis Date Noted   • **Hyponatremia [E87.1] 04/02/2018     Priority: High     Na 114 today in the ER. 2 week hx of vomiting with no food intake.  -Nephrology consulted. Will correct the sodium 8-10 MG daily point per day.  -ER started 200cc bolus hypertonic saline.  -Urine Na, Osmolality, and creatinine normal.  -Uric acid and TSH and 8 AM cortisol normal.  -Na 134.  -NS [id number].  -Nephrology cleared patient for discharge.     • Other dysphagia [R13.19] 04/04/2018     Priority: Medium     -Vague history of occasional difficulty swallowing.  -Given hyponatremia, extensive smoking history, plan to further evaluate once electrolytes corrected.     • Hypertension [I10] 04/02/2018     -Stable  -Continue Lisinopril.     • Gastroesophageal reflux disease [K21.9] 04/02/2018     Stable  -Continue home medication PPI.     • Benign prostatic hyperplasia with lower urinary tract symptoms [N40.1] 04/02/2018     -Continue Flomax from home.        Resolved Hospital Problems    Diagnosis Date Noted Date Resolved   • Hypokalemia [E87.6] 04/02/2018 04/06/2018     Priority: High     Potassium 2.3 in the ER on admission.  - EKG unremarkable.  -K 3.8.         DVT prophylaxis: SCDs/TEDs  Code status is Full Code    Plan for disposition:Where: home and When:  electrolytes correct and pt stable.        This document has been electronically signed by Janusz Patrick MD on April 6, 2018 7:06 AM

## 2018-04-06 NOTE — PROGRESS NOTES
[address] [address]  T - [id number]         PROGRESS NOTE         SUBJECTIVE:   Patient Care Team:  ERNST Celestin as PCP - General    Chief Complaint:   Chief Compliant: weakness    Subjective     Patient is 62 y.o. male presents with weakness and hyponatremia. He is ready for discharge.      ROS/HISTORY/ CURRENT MEDICATIONS/OBJECTIVE/VS/PE:   Review of Systems:   Review of Systems    History:     Past Medical History:   Diagnosis Date   • Hypertension      Past Surgical History:   Procedure Laterality Date   • HERNIA REPAIR       History reviewed. No pertinent family history.  Social History   Substance Use Topics   • Smoking status: Current Some Day Smoker   • Smokeless tobacco: Never Used   • Alcohol use Yes      Comment: occassional     Prescriptions Prior to Admission   Medication Sig Dispense Refill Last Dose   • esomeprazole (NEXIUM) 40 MG capsule Take 40 mg by mouth Daily.   4/2/2018 at 0900   • lisinopril-hydrochlorothiazide (PRINZIDE,ZESTORETIC) 10-12.5 MG per tablet Take 1 tablet by mouth Daily.   4/2/2018 at 0900   • tamsulosin (FLOMAX) 0.4 MG capsule 24 hr capsule Take 0.4 mg by mouth Daily.      • amoxicillin (AMOXIL) 500 MG capsule Take 500 mg by mouth 2 (Two) Times a Day. For 10 days. Per pt/wife, started taking about 4 days ago. Pt has only been taking once daily.   Unknown at Unknown time     Allergies:  Review of patient's allergies indicates no known allergies.    Current Medications:     Current Facility-Administered Medications   Medication Dose Route Frequency Provider Last Rate Last Dose   • acetaminophen (TYLENOL) tablet 650 mg  650 mg Oral Q4H PRN Janusz Patrick MD   650 mg at 04/05/18 0215   • hydrALAZINE (APRESOLINE) injection 20 mg  20 mg Intravenous Q4H PRN Janusz Patrick MD       • lisinopril (PRINIVIL,ZESTRIL) tablet 5 mg  5 mg Oral Q24H Gilma Britt MD   5 mg at 04/06/18 0926   • ondansetron (ZOFRAN) tablet 4 mg  4 mg  Oral Q6H PRN Janusz Patrick MD       • pantoprazole (PROTONIX) EC tablet 40 mg  40 mg Oral BID AC Janusz Patrick MD   40 mg at 04/06/18 0926   • sodium chloride 0.9 % flush 1-10 mL  1-10 mL Intravenous PRN Janusz Patrick MD   10 mL at 04/05/18 0849   • sodium chloride tablet 1 g  1 g Oral TID With Meals Edwin Pizarro APRJAMIN   1 g at 04/06/18 1145   • tamsulosin (FLOMAX) 24 hr capsule 0.4 mg  0.4 mg Oral Q24H Janusz Patrick MD   0.4 mg at 04/06/18 0926       Objective     Physical Exam:   Temp:  [96.6 °F (35.9 °C)-97.7 °F (36.5 °C)] (P) 97 °F (36.1 °C)  Heart Rate:  [66-81] (P) 81  Resp:  [16-18] (P) 18  BP: (112-130)/(62-68) (P) 120/78    Physical Exam   Constitutional: He appears well-developed and well-nourished.   HENT:   Head: Normocephalic and atraumatic.   Cardiovascular: Normal rate, regular rhythm and normal heart sounds.  Exam reveals no gallop and no friction rub.    No murmur heard.  Pulmonary/Chest: Effort normal and breath sounds normal. No respiratory distress. He has no wheezes. He has no rales.   Abdominal: Soft. Bowel sounds are normal.   Skin: Skin is warm and dry.   Vitals reviewed.           Results Review:   Lab Results   Component Value Date    GLUCOSE 85 04/06/2018    BUN 5 (L) 04/06/2018    CREATININE 0.60 (L) 04/06/2018    EGFRIFNONA 137 (H) 04/06/2018    EGFRIFAFRI 165 (H) 04/06/2018    BCR 8.3 04/06/2018    CO2 26.0 04/06/2018    CALCIUM 8.3 (L) 04/06/2018    ALBUMIN 2.80 (L) 04/06/2018    LABIL2 1.3 04/06/2018    AST 29 04/06/2018    ALT 68 04/06/2018         WBC WBC   Date Value Ref Range Status   04/06/2018 5.34 3.20 - 9.80 10*3/mm3 Final   04/05/2018 5.15 3.20 - 9.80 10*3/mm3 Final   04/04/2018 5.61 3.20 - 9.80 10*3/mm3 Final      HGB Hemoglobin   Date Value Ref Range Status   04/06/2018 10.6 (L) 13.7 - 17.3 g/dL Final   04/05/2018 11.4 (L) 13.7 - 17.3 g/dL Final   04/04/2018 12.3 (L) 13.7 - 17.3 g/dL Final      HCT Hematocrit   Date Value Ref Range Status   04/06/2018 28.8 (L)  39.0 - 49.0 % Final   04/05/2018 30.5 (L) 39.0 - 49.0 % Final   04/04/2018 32.9 (L) 39.0 - 49.0 % Final      Platlets Platelets   Date Value Ref Range Status   04/06/2018 186 150 - 450 10*3/mm3 Final   04/05/2018 182 150 - 450 10*3/mm3 Final   04/04/2018 187 150 - 450 10*3/mm3 Final          Imaging Results (last 24 hours)     ** No results found for the last 24 hours. **           I reviewed the patient's new clinical results.  I reviewed the patient's new imaging results and agree with the interpretation.     ASSESSMENT/PLAN:   Assessment/Plan   Principal Problem:    Hyponatremia  Active Problems:    Benign prostatic hyperplasia with lower urinary tract symptoms    Gastroesophageal reflux disease    Hypertension    Other dysphagia      Disposition is home.  Activity as tolerated.  Diet as ordered.  Follow up as ordered.  Medications as per Medication Reconcilliation.    I have reviewed the notes, assessments, and/or procedures performed by the resident  , I concur with her/his documentation of Barron Ackerman.    I discussed the patients findings and my recommendations with patient and family.      Hector Maloney MD  04/06/18  11:57 AM

## 2018-04-06 NOTE — DISCHARGE SUMMARY
DISCHARGE SUMMARY    PATIENT NAME: Barron Ackerman       PHYSICIAN: Janusz Patrick MD  : 1955  MRN: [id number]    ADMITTED: 2018     DISCHARGED: 18    ADMISSION DIAGNOSES:  Active Hospital Problems (** Indicates Principal Problem)    Diagnosis Date Noted   • **Hyponatremia [E87.1] 2018     Priority: High   • Other dysphagia [R13.19] 2018     Priority: Medium   • Hypertension [I10] 2018   • Gastroesophageal reflux disease [K21.9] 2018   • Benign prostatic hyperplasia with lower urinary tract symptoms [N40.1] 2018      Resolved Hospital Problems    Diagnosis Date Noted Date Resolved   • Hypokalemia [E87.6] 2018     Priority: High     DISCHARGE DIAGNOSES:   Active Hospital Problems (** Indicates Principal Problem)    Diagnosis Date Noted   • **Hyponatremia [E87.1] 2018     Priority: High   • Other dysphagia [R13.19] 2018     Priority: Medium   • Hypertension [I10] 2018   • Gastroesophageal reflux disease [K21.9] 2018   • Benign prostatic hyperplasia with lower urinary tract symptoms [N40.1] 2018      Resolved Hospital Problems    Diagnosis Date Noted Date Resolved   • Hypokalemia [E87.6] 2018     Priority: High       SERVICE: Family Medicine.  Attending: Dr. Maloney  Resident: Janusz Patrick MD    CONSULTS:   Consult Orders (all)     Start     Ordered    18 1712  Inpatient Nutrition Consult  Once     Provider:  (Not yet assigned)    18 1712    18 1555  Family Practice - Resident (on-call MD unless specified)  Once     Specialty:  Family Medicine  Provider:  Shay Brewster MD    18 1558          PROCEDURES:   N/A    HISTORY OF PRESENT ILLNESS:   Barron Ackerman is a 62 y.o. male for the last 2 weeks has had flulike symptoms including muscle aches, joint aches, cough, nasal congestion, rhinorrhea, cough, with some nausea and vomiting.  He has not been able to keep anything down for for  2 weeks.  He has been able to drink approximately 36 ounces of water a day.  Patient denies any fevers or chills.  He has had a couple episodes of diarrhea.  He becomes lightheaded and dizzy when he stands up, this started yesterday.  For the past 2-3 days he's had worsening cough is causing chest pain.  He went to the doctor's today who did an EKG and sent him to the ER via EMS.  Patient has not tried anything at home medication wise to help with his symptoms.  He reports having lost 12 pounds in the last 2 weeks.     In the ER he was found to have a sodium of 114 and potassium of 2.3.  He was given 40 KCl by mouth and 10 IV.  Patient started on IV 20cc/hr hypertonic saline.nephrology was consulted Nephrology was consulted.  EKG showed normal sinus rhythm, troponin was negative.    DIAGNOSTIC DATA:   Xr Abdomen 2 View With Chest 1 View    Result Date: 4/2/2018  EXAMINATION:  Acute abdominal series            VIEWS:  Chest:  1 ; Abdo:  2 DATE/TIME:     4/2/2018 2:59 PM CDT   INDICATION: vomiting , chest pain , COMPARISON EXAMINATION:    none                          FINDINGS:                         Chest:     heart size: within normal limits     mediastinal contour: within normal limits     lungs: no evidence of focal air space disease, grossly negative     pleura:  no pleural fluid     osseous: limited, grossly negative for age.     misc: Abdomen:     bowel gas pattern: nonobstructive     free air:  none visualized     calculi:   none visualized     osseous:  limited, grossly negative for age.     misc:       CONCLUSION:    1. Negative examination.                            If pain or symptoms persist beyond reasonable expectations, a contrast enhanced CT examination is suggested, as is deemed clinical appropriate.               Electronically signed by:  JOSE Cunha MD  4/2/2018 3:00 PM CDT Workstation: [id number]    Xr Chest 1 View    Result Date: 4/3/2018  PROCEDURE: Single chest view AP REASON FOR  EXAM:hyponatremia smoker wt loss rule out mass, E87.1 Hypo-osmolality and hyponatremia E87.6 Hypokalemia R53.1 Weakness R07.9 Chest pain, unspecified FINDINGS: Comparison exam dated April 2, 2018. Cardiac and pulmonary vasculature are normal. Lungs are clear. Pleural spaces are normal. No acute osseous abnormality.     Negative single view chest Electronically signed by:  Uriah Howe MD  4/3/2018 8:11 AM CDT Workstation: [id number]     HOSPITAL COURSE:  Patient was brought into the hospital for treatment for his hyponatremia and hypokalemia.  He was initially admitted to the ICU.  Patient received hypertonic saline until his sodium increased to 120.  At which time he was switched to normal saline.  Nephrology was consulted and they've managed his sodium correction.  Once his sodium corrected to 120 he was transferred to the floor.  His potassium was replaced appropriately.  By the following day patient reported that his dizziness and fatigue Greatly improved.  He did not have any nausea, vomiting, diarrhea, seizure episodes while in the hospital.  His lisinopril and hydrochlorothiazide were initially halted on admission due to his electrolyte abnormalities.  On hospital day 4 he was discharged from the hospital after nephrology cleared him.  Patient was discharged on salt pills per nephrology.  His lisinopril was restarted at 5 MG.  He will follow-up with his PCP in 2-3 days.  Nephrology would like to see him in clinic in 2-3 weeks.    DISCHARGE CONDITION:   Stable/improved    DISPOSITION:  Home or Self Care    DISCHARGE MEDICATIONS   Barron Ackerman   Home Medication Instructions JUAN:[id number]    Printed on:04/06/18 6918   Medication Information                      amoxicillin (AMOXIL) 500 MG capsule  Take 500 mg by mouth 2 (Two) Times a Day. For 10 days. Per pt/wife, started taking about 4 days ago. Pt has only been taking once daily.             esomeprazole (NEXIUM) 40 MG capsule  Take 40 mg by mouth Daily.              lisinopril (PRINIVIL,ZESTRIL) 5 MG tablet  Take 1 tablet by mouth Daily for 90 days.             sodium chloride 1 g tablet  Take 1 tablet by mouth 3 (Three) Times a Day With Meals for 30 days.             tamsulosin (FLOMAX) 0.4 MG capsule 24 hr capsule  Take 0.4 mg by mouth Daily.                 INSTRUCTIONS:  Activity:   Activity Instructions     Activity as Tolerated           Diet:   Diet Instructions     Advance Diet As Tolerated           Special instructions: Patient instructed to call MD or return to ED with worsening shortness of breath, chest pain, fever greater than 100.4 degrees F or any other medical concerns..    FOLLOW UP:   Additional Instructions for the Follow-ups that You Need to Schedule     Discharge Follow-up with PCP    As directed      Follow Up Details:  Hospital f/u in 3-4 days         Discharge Follow-up with Specified Provider: Dr. Britt; 2 Weeks    As directed      To:  Dr. Britt    Follow Up:  2 Weeks    Follow Up Details:  Hospital f/u         Basic Metabolic Panel     Apr 16, 2018 (Approximate)        Follow-up Information     ERNST Celestin Follow up on 4/11/2018.    Specialty:  Family Medicine  Why:  Hospital follow up appointment Wednesday 4/11/18 at 1:30pm.  Contact information:  [address] [place], [place] [id number]  [phone]             ERNST Celestin Follow up.    Specialty:  Family Medicine  Why:  Hospital f/u in 3-4 days  Contact information:  [address] [place] [id number]  [phone]             Gilma Britt MD. Go on 4/24/2018.    Specialty:  Nephrology  Why:  Tuesday 1:45 pm  Contact information:  [address] [place] [id number] [phone]                   PENDING TEST RESULTS AT DISCHARGE      Time: Discharge >30 min    Dr. Maloney is the attending at time of discharge, He is aware of the patient's status and agrees with the above discharge summary.        This document has been electronically signed by Janusz  MD Darnell on April 6, 2018 1:39 PM

## 2018-04-09 NOTE — PAYOR COMM NOTE
"Barron Lehman (62 y.o. Male)     Date of Birth Social Security Number Address Home Phone MRN    1955  [address] [place] [id number] [phone] [id number]    [employer] Status          [employer]        Admission Date Admission Type Admitting Provider Attending Provider Department, Room/Bed    18 Emergency Shay Brewster MD  [address], [facility]/1    Discharge Date Discharge Disposition Discharge Destination        2018 Home or Self Care              Attending Provider:  (none)   Allergies:  No Known Allergies    Isolation:  None   Infection:  None   Code Status:  Prior    Ht:  177.8 cm (70\")   Wt:  79.9 kg (176 lb 3.2 oz)    Admission Cmt:  None   Principal Problem:  Hyponatremia [E87.1] More...                 Active Insurance as of 2018     Primary Coverage     Payor Plan Insurance Group Employer/Plan Group    PASSPORT PASSPORT MEDICAID     Payor Plan Address Payor Plan Phone Number Effective From Effective To    [address] [phone] [address]     [place] [address]       Subscriber Name Subscriber Birth Date Member ID       BARRON LEHMAN 1955 [id number]                 Emergency Contacts      (Rel.) Home Phone Work Phone Mobile Phone    April Lehman (Spouse) [phone] -- [phone]               Discharge Summary      Janusz Patrick MD at 2018 10:57 AM     Attestation signed by Hector Maloney MD at 2018  2:54 PM    I have reviewed the notes, assessments, and/or procedures performed by the resident  , I concur with her/his documentation of Barron Lehman.        This document has been electronically signed by Hector Maloney MD on 2018 2:54 PM                          DISCHARGE SUMMARY    PATIENT NAME: Barron Lehman       PHYSICIAN: Janusz Patrick MD  : 1955  MRN: [id number]    ADMITTED: 2018     DISCHARGED: 18    ADMISSION DIAGNOSES:  Active Hospital Problems (** Indicates " Principal Problem)    Diagnosis Date Noted   • **Hyponatremia [E87.1] 04/02/2018     Priority: High   • Other dysphagia [R13.19] 04/04/2018     Priority: Medium   • Hypertension [I10] 04/02/2018   • Gastroesophageal reflux disease [K21.9] 04/02/2018   • Benign prostatic hyperplasia with lower urinary tract symptoms [N40.1] 04/02/2018      Resolved Hospital Problems    Diagnosis Date Noted Date Resolved   • Hypokalemia [E87.6] 04/02/2018 04/06/2018     Priority: High     DISCHARGE DIAGNOSES:   Active Hospital Problems (** Indicates Principal Problem)    Diagnosis Date Noted   • **Hyponatremia [E87.1] 04/02/2018     Priority: High   • Other dysphagia [R13.19] 04/04/2018     Priority: Medium   • Hypertension [I10] 04/02/2018   • Gastroesophageal reflux disease [K21.9] 04/02/2018   • Benign prostatic hyperplasia with lower urinary tract symptoms [N40.1] 04/02/2018      Resolved Hospital Problems    Diagnosis Date Noted Date Resolved   • Hypokalemia [E87.6] 04/02/2018 04/06/2018     Priority: High       SERVICE: Family Medicine.  Attending: Dr. Maloney  Resident: Janusz Patrick MD    CONSULTS:   Consult Orders (all)     Start     Ordered    04/02/18 1712  Inpatient Nutrition Consult  Once     Provider:  (Not yet assigned)    04/02/18 1712    04/02/18 1555  Family Practice - Resident (on-call MD unless specified)  Once     Specialty:  Family Medicine  Provider:  Shay Brewster MD    04/02/18 1555          PROCEDURES:   N/A    HISTORY OF PRESENT ILLNESS:   Barron Ackerman is a 62 y.o. male for the last 2 weeks has had flulike symptoms including muscle aches, joint aches, cough, nasal congestion, rhinorrhea, cough, with some nausea and vomiting.  He has not been able to keep anything down for for 2 weeks.  He has been able to drink approximately 36 ounces of water a day.  Patient denies any fevers or chills.  He has had a couple episodes of diarrhea.  He becomes lightheaded and dizzy when he stands up, this started yesterday.   For the past 2-3 days he's had worsening cough is causing chest pain.  He went to the doctor's today who did an EKG and sent him to the ER via EMS.  Patient has not tried anything at home medication wise to help with his symptoms.  He reports having lost 12 pounds in the last 2 weeks.     In the ER he was found to have a sodium of 114 and potassium of 2.3.  He was given 40 KCl by mouth and 10 IV.  Patient started on IV 20cc/hr hypertonic saline.nephrology was consulted Nephrology was consulted.  EKG showed normal sinus rhythm, troponin was negative.    DIAGNOSTIC DATA:   Xr Abdomen 2 View With Chest 1 View    Result Date: 4/2/2018  EXAMINATION:  Acute abdominal series            VIEWS:  Chest:  1 ; Abdo:  2 DATE/TIME:     4/2/2018 2:59 PM CDT   INDICATION: vomiting , chest pain , COMPARISON EXAMINATION:    none                          FINDINGS:                         Chest:     heart size: within normal limits     mediastinal contour: within normal limits     lungs: no evidence of focal air space disease, grossly negative     pleura:  no pleural fluid     osseous: limited, grossly negative for age.     misc: Abdomen:     bowel gas pattern: nonobstructive     free air:  none visualized     calculi:   none visualized     osseous:  limited, grossly negative for age.     misc:       CONCLUSION:    1. Negative examination.                            If pain or symptoms persist beyond reasonable expectations, a contrast enhanced CT examination is suggested, as is deemed clinical appropriate.               Electronically signed by:  JOSE Cunha MD  4/2/2018 3:00 PM CDT Workstation: [id number]    Xr Chest 1 View    Result Date: 4/3/2018  PROCEDURE: Single chest view AP REASON FOR EXAM:hyponatremia smoker wt loss rule out mass, E87.1 Hypo-osmolality and hyponatremia E87.6 Hypokalemia R53.1 Weakness R07.9 Chest pain, unspecified FINDINGS: Comparison exam dated April 2, 2018. Cardiac and pulmonary vasculature are  normal. Lungs are clear. Pleural spaces are normal. No acute osseous abnormality.     Negative single view chest Electronically signed by:  Uriah Howe MD  4/3/2018 8:11 AM CDT Workstation: [id number]     HOSPITAL COURSE:  Patient was brought into the hospital for treatment for his hyponatremia and hypokalemia.  He was initially admitted to the ICU.  Patient received hypertonic saline until his sodium increased to 120.  At which time he was switched to normal saline.  Nephrology was consulted and they've managed his sodium correction.  Once his sodium corrected to 120 he was transferred to the floor.  His potassium was replaced appropriately.  By the following day patient reported that his dizziness and fatigue Greatly improved.  He did not have any nausea, vomiting, diarrhea, seizure episodes while in the hospital.  His lisinopril and hydrochlorothiazide were initially halted on admission due to his electrolyte abnormalities.  On hospital day 4 he was discharged from the hospital after nephrology cleared him.  Patient was discharged on salt pills per nephrology.  His lisinopril was restarted at 5 MG.  He will follow-up with his PCP in 2-3 days.  Nephrology would like to see him in clinic in 2-3 weeks.    DISCHARGE CONDITION:   Stable/improved    DISPOSITION:  Home or Self Care    DISCHARGE MEDICATIONS   Barron Ackerman   Home Medication Instructions JUAN:[id number]    Printed on:04/06/18 1319   Medication Information                      amoxicillin (AMOXIL) 500 MG capsule  Take 500 mg by mouth 2 (Two) Times a Day. For 10 days. Per pt/wife, started taking about 4 days ago. Pt has only been taking once daily.             esomeprazole (NEXIUM) 40 MG capsule  Take 40 mg by mouth Daily.             lisinopril (PRINIVIL,ZESTRIL) 5 MG tablet  Take 1 tablet by mouth Daily for 90 days.             sodium chloride 1 g tablet  Take 1 tablet by mouth 3 (Three) Times a Day With Meals for 30 days.             tamsulosin (FLOMAX)  0.4 MG capsule 24 hr capsule  Take 0.4 mg by mouth Daily.                 INSTRUCTIONS:  Activity:   Activity Instructions     Activity as Tolerated           Diet:   Diet Instructions     Advance Diet As Tolerated           Special instructions: Patient instructed to call MD or return to ED with worsening shortness of breath, chest pain, fever greater than 100.4 degrees F or any other medical concerns..    FOLLOW UP:   Additional Instructions for the Follow-ups that You Need to Schedule     Discharge Follow-up with PCP    As directed      Follow Up Details:  Hospital f/u in 3-4 days         Discharge Follow-up with Specified Provider: Dr. [place]; 2 Weeks    As directed      To:  Dr. [place]    Follow Up:  2 Weeks    Follow Up Details:  Hospital f/u         Basic Metabolic Panel     Apr 16, 2018 (Approximate)        Follow-up Information     [facility] [place] Follow up on 4/11/2018.    Specialty:  Family Medicine  Why:  Hospital follow up appointment Wednesday 4/11/18 at 1:30pm.  Contact information:  [address] [place] [address]  [phone]             [facility] [place] Follow up.    Specialty:  Family Medicine  Why:  Hospital f/u in 3-4 days  Contact information:  [address] [place], [place] [id number]  [phone]             [place]ma [place] MD. Go on 4/24/2018.    Specialty:  Nephrology  Why:  Tuesday 1:45 pm  Contact information:  [address] [place] [address] [id number] [phone]                   PENDING TEST RESULTS AT DISCHARGE      Time: Discharge >30 min    Dr. Maloney is the attending at time of discharge, He is aware of the patient's status and agrees with the above discharge summary.        This document has been electronically signed by Janusz Patrick MD on April 6, 2018 1:39 PM    Electronically signed by Hector Maloney MD at 4/7/2018  2:54 PM

## 2018-07-02 RX ORDER — LISINOPRIL 5 MG/1
TABLET ORAL
Qty: 90 TABLET | Refills: 0 | OUTPATIENT
Start: 2018-07-02

## 2021-03-03 ENCOUNTER — HOSPITAL ENCOUNTER (OUTPATIENT)
Dept: ULTRASOUND IMAGING | Facility: HOSPITAL | Age: 66
Discharge: HOME | End: 2021-03-03

## 2021-03-03 DIAGNOSIS — R10.32 LEFT INGUINAL PAIN: ICD-10-CM

## 2021-03-03 DIAGNOSIS — N50.812 TESTICULAR PAIN, LEFT: ICD-10-CM

## 2021-03-03 PROCEDURE — 76870 US EXAM SCROTUM: CPT

## 2021-03-03 PROCEDURE — 93975 VASCULAR STUDY: CPT

## 2021-03-03 PROCEDURE — 76881 US COMPL JOINT R-T W/IMG: CPT

## 2021-06-24 ENCOUNTER — TRANSCRIBE ORDERS (OUTPATIENT)
Dept: ORTHOPEDIC SURGERY | Facility: CLINIC | Age: 66
End: 2021-06-24

## 2021-06-24 DIAGNOSIS — M79.642 LEFT HAND PAIN: Primary | ICD-10-CM

## 2021-07-08 ENCOUNTER — OFFICE VISIT (OUTPATIENT)
Dept: ORTHOPEDIC SURGERY | Facility: CLINIC | Age: 66
End: 2021-07-08

## 2021-07-08 VITALS
OXYGEN SATURATION: 97 % | SYSTOLIC BLOOD PRESSURE: 126 MMHG | HEART RATE: 75 BPM | HEIGHT: 70 IN | RESPIRATION RATE: 18 BRPM | BODY MASS INDEX: 25.2 KG/M2 | DIASTOLIC BLOOD PRESSURE: 78 MMHG | WEIGHT: 176 LBS

## 2021-07-08 DIAGNOSIS — M79.642 HAND PAIN, LEFT: Primary | ICD-10-CM

## 2021-07-08 DIAGNOSIS — M18.12 ARTHRITIS OF CARPOMETACARPAL (CMC) JOINT OF LEFT THUMB: ICD-10-CM

## 2021-07-08 PROCEDURE — 20600 DRAIN/INJ JOINT/BURSA W/O US: CPT | Performed by: ORTHOPAEDIC SURGERY

## 2021-07-08 PROCEDURE — 99202 OFFICE O/P NEW SF 15 MIN: CPT | Performed by: ORTHOPAEDIC SURGERY

## 2021-07-08 RX ORDER — FUROSEMIDE 40 MG/1
40 TABLET ORAL DAILY
COMMUNITY

## 2021-07-08 RX ORDER — TRIAMCINOLONE ACETONIDE 40 MG/ML
10 INJECTION, SUSPENSION INTRA-ARTICULAR; INTRAMUSCULAR
Status: COMPLETED | OUTPATIENT
Start: 2021-07-08 | End: 2021-07-08

## 2021-07-08 RX ORDER — HYDROCODONE BITARTRATE AND ACETAMINOPHEN 7.5; 325 MG/1; MG/1
TABLET ORAL
COMMUNITY
Start: 2021-06-17

## 2021-07-08 RX ORDER — BUPIVACAINE HYDROCHLORIDE 5 MG/ML
0.5 INJECTION, SOLUTION PERINEURAL
Status: COMPLETED | OUTPATIENT
Start: 2021-07-08 | End: 2021-07-08

## 2021-07-08 RX ADMIN — BUPIVACAINE HYDROCHLORIDE 0.5 ML: 5 INJECTION, SOLUTION PERINEURAL at 11:27

## 2021-07-08 RX ADMIN — TRIAMCINOLONE ACETONIDE 10 MG: 40 INJECTION, SUSPENSION INTRA-ARTICULAR; INTRAMUSCULAR at 11:27

## 2021-07-08 NOTE — PROGRESS NOTES
Barron Ackerman is a 66 y.o. male   Primary provider:  Danial Vaughan APRN       Chief Complaint   Patient presents with   • Left Hand - Initial Evaluation, Pain       HISTORY OF PRESENT ILLNESS:painful left hand thumb and wrist.  Pain scale today 5/10    This is the first office visit for evaluation of pain in the left thumb and wrist.    Mr. Ackerman is 66 years old and right-hand dominant.  He said about 2 years ago he had the tip of his right thumb and had pain in the thumb.  This slowly resolved.  About 2 weeks ago he had a similar injury with recurrence of his pain.  He complains of pain circumferentially around the wrist as well as in the thumb the pain is worse at night and and worse with use of the hand.  The pain is relieved by rest.  He has had no treatment for this.  There is been no numbness or tingling.    Home medications include Nexium Flomax and Lasix.  He also takes 15 mg of hydrocodone daily chronically.  Past medical history is remarkable for hypertension and hyponatremia.  He is retired and .    [facility] Montes has asked that I see him for evaluation and treatment.    Pain  This is a recurrent problem. The current episode started 1 to 4 weeks ago. The problem occurs constantly. The problem has been gradually worsening. Associated symptoms comments: Aching,clicking/popping/snapping. Exacerbated by: use of left hand ,sitting. He has tried NSAIDs and acetaminophen for the symptoms. The treatment provided no relief.        CONCURRENT MEDICAL HISTORY:    Past Medical History:   Diagnosis Date   • Hypertension        No Known Allergies      Current Outpatient Medications:   •  amoxicillin (AMOXIL) 500 MG capsule, Take 500 mg by mouth 2 (Two) Times a Day. For 10 days. Per pt/wife, started taking about 4 days ago. Pt has only been taking once daily., Disp: , Rfl:   •  esomeprazole (NEXIUM) 40 MG capsule, Take 40 mg by mouth Daily., Disp: , Rfl:   •  tamsulosin (FLOMAX) 0.4 MG capsule 24  "hr capsule, Take 0.4 mg by mouth Daily., Disp: , Rfl:   •  furosemide (LASIX) 40 MG tablet, Take 40 mg by mouth Daily., Disp: , Rfl:   •  HYDROcodone-acetaminophen (NORCO) 7.5-325 MG per tablet, , Disp: , Rfl:     Past Surgical History:   Procedure Laterality Date   • HERNIA REPAIR         No family history on file.     Social History     Socioeconomic History   • Marital status:      Spouse name: Not on file   • Number of children: Not on file   • Years of education: Not on file   • Highest education level: Not on file   Tobacco Use   • Smoking status: Current Some Day Smoker   • Smokeless tobacco: Never Used   Substance and Sexual Activity   • Alcohol use: Yes     Comment: occassional   • Drug use: No        Review of Systems   Musculoskeletal:        Left hand pain     All other systems reviewed and are negative.    Review of systems is positive as noted above.  PHYSICAL EXAMINATION:       Vitals:    07/08/21 1021   BP: 126/78   Pulse: 75   Resp: 18   SpO2: 97%   Weight: 79.8 kg (176 lb)   Height: 177.8 cm (70\")   PainSc:   5       Physical Exam he is alert pleasant and in no apparent distress.    GAIT:     [x]  Normal  []  Antalgic    Assistive device: [x]  None  []  Walker     []  Crutches  []  Cane     []  Wheelchair  []  Stretcher    Ortho Exam is directed to the left upper extremity.  Digital wrist and forearm motions are full.  There is no tenderness about the wrist.  There is moderate tenderness at the thumb carpometacarpal joint.  Passive manipulation of the joint produces mild crepitus and moderate pain.  Radial pulses strong.  Sensory exam is intact to soft touch.      XR Hand 3+ View Left    Result Date: 7/8/2021  Narrative: Ordering Provider:  Otilio Osorio MD Ordering Diagnosis/Indication:  Hand pain, left Procedure:  XR HAND 3+ VW LEFT Exam Date:  7/8/21 COMPARISON: None     Impression:  Radiographs of the left hand PA lateral and oblique views done today show moderately severe " degenerative change of the thumb carpometacarpal joint.  There is also narrowing of the thumb MCP joint.  There is a healed fracture of the distal radius with shortening estimated at 4 to 5 mm. There is also mild narrowing of the scaphoid trapezial joint. Otilio Osorio MD 7/8/21          ASSESSMENT: Carpometacarpal arthritis left thumb.    The natural history of the disorder and treatment options were reviewed.  He was reassured I see no surgical indications at this point.    He was instructed in symptomatic care.  Potential benefits of injection therapy were discussed.  He wished to proceed with this.    The left hand was prepped.  Skin was infiltrated with 1% Xylocaine with epinephrine.  The thumb carpometacarpal joint was then injected with a mixture of Marcaine Kenalog and Xylocaine with epinephrine.  There were no complications.    Return here in 1 month if his symptoms have not improved.  Diagnoses and all orders for this visit:    Hand pain, left  -     XR Hand 3+ View Left    Arthritis of carpometacarpal (CMC) joint of left thumb    Other orders  -     furosemide (LASIX) 40 MG tablet; Take 40 mg by mouth Daily.  -     HYDROcodone-acetaminophen (NORCO) 7.5-325 MG per tablet  -     Small Joint Arthrocentesis: L thumb CMC          PLAN        Small Joint Arthrocentesis: L thumb CMC  Consent given by: patient  Site marked: site marked  Timeout: Immediately prior to procedure a time out was called to verify the correct patient, procedure, equipment, support staff and site/side marked as required   Supporting Documentation  Indications: pain   Procedure Details  Location: thumb - L thumb CMC  Preparation: Patient was prepped and draped in the usual sterile fashion  Needle size: 22 G  Approach: lateral  Medications administered: 0.5 mL bupivacaine 0.5 %; 10 mg triamcinolone acetonide 40 MG/ML  Patient tolerance: patient tolerated the procedure well with no immediate complications            Return in about 4  weeks (around 8/5/2021).    Otilio Osorio MD

## 2023-07-27 ENCOUNTER — OFFICE VISIT (OUTPATIENT)
Dept: ORTHOPEDIC SURGERY | Facility: CLINIC | Age: 68
End: 2023-07-27
Payer: MEDICARE

## 2023-07-27 VITALS — HEIGHT: 70 IN | WEIGHT: 191 LBS | BODY MASS INDEX: 27.35 KG/M2

## 2023-07-27 DIAGNOSIS — M25.512 ACUTE PAIN OF LEFT SHOULDER: ICD-10-CM

## 2023-07-27 DIAGNOSIS — K21.9 GASTROESOPHAGEAL REFLUX DISEASE WITHOUT ESOPHAGITIS: ICD-10-CM

## 2023-07-27 DIAGNOSIS — M75.102 ROTATOR CUFF SYNDROME OF LEFT SHOULDER: Primary | ICD-10-CM

## 2023-07-27 DIAGNOSIS — I10 HYPERTENSION, BENIGN: ICD-10-CM

## 2023-07-27 RX ORDER — ACETAMINOPHEN 500 MG
500 TABLET ORAL EVERY 6 HOURS PRN
COMMUNITY

## 2023-07-27 RX ORDER — MELOXICAM 15 MG/1
TABLET ORAL
Qty: 30 TABLET | Refills: 3 | Status: SHIPPED | OUTPATIENT
Start: 2023-07-27

## 2023-07-27 NOTE — PROGRESS NOTES
Barron Ackerman is a 68 y.o. male   Primary provider:  Danial Vaughan APRN       Chief Complaint   Patient presents with    Left Shoulder - Pain       HISTORY OF PRESENT ILLNESS: Patient is here today for left shoulder pain. He was sent to [facility] upon arrival.   Was working in the yard in January and was throwing a stick when he felt a sharp pain.  Severe pain at night.  Prior steroid injection in left shoulder.  Tylenol and ibuprofen minimal relief.      Pain  This is a chronic problem. The current episode started more than 1 year ago. The problem occurs constantly. The problem has been unchanged. Associated symptoms include arthralgias. Associated symptoms comments: Stabbing pain. Exacerbated by: sitting. He has tried NSAIDs, acetaminophen and oral narcotics for the symptoms. The treatment provided mild relief.      CONCURRENT MEDICAL HISTORY:    Past Medical History:   Diagnosis Date    Hypertension        No Known Allergies      Current Outpatient Medications:     acetaminophen (TYLENOL) 500 MG tablet, Take 1 tablet by mouth Every 6 (Six) Hours As Needed for Mild Pain., Disp: , Rfl:     esomeprazole (NEXIUM) 40 MG capsule, Take 1 capsule by mouth Daily., Disp: , Rfl:     furosemide (LASIX) 40 MG tablet, Take 1 tablet by mouth Daily., Disp: , Rfl:     HYDROcodone-acetaminophen (NORCO) 7.5-325 MG per tablet, , Disp: , Rfl:     tamsulosin (FLOMAX) 0.4 MG capsule 24 hr capsule, Take 1 capsule by mouth Daily., Disp: , Rfl:     meloxicam (MOBIC) 15 MG tablet, 1 PO Daily with food., Disp: 30 tablet, Rfl: 3    Past Surgical History:   Procedure Laterality Date    HERNIA REPAIR         History reviewed. No pertinent family history.     Social History     Socioeconomic History    Marital status:    Tobacco Use    Smoking status: Some Days    Smokeless tobacco: Never   Substance and Sexual Activity    Alcohol use: Yes     Comment: occassional    Drug use: No        Review of Systems   Constitutional: Negative.   "  HENT: Negative.     Eyes: Negative.    Respiratory: Negative.     Cardiovascular: Negative.    Gastrointestinal: Negative.    Endocrine: Negative.    Genitourinary: Negative.    Musculoskeletal:  Positive for arthralgias.   Skin: Negative.    Allergic/Immunologic: Negative.    Neurological: Negative.    Hematological: Negative.    Psychiatric/Behavioral:  Positive for sleep disturbance.      PHYSICAL EXAMINATION:       Ht 177.8 cm (70\")   Wt 86.6 kg (191 lb)   BMI 27.41 kg/m²     Physical Exam  Constitutional:       General: He is not in acute distress.     Appearance: Normal appearance.   Pulmonary:      Effort: Pulmonary effort is normal. No respiratory distress.   Neurological:      Mental Status: He is alert and oriented to person, place, and time.       GAIT:     []  Normal  []  Antalgic    Assistive device: []  None  []  Walker     []  Crutches  []  Cane     []  Wheelchair  []  Stretcher    Left Shoulder Exam      Comments:  No tenderness on exam.  Shoulder flexion 90 degrees (120 degrees with assistance).  Shoulder abduction 60 degrees (120 degrees with assistance).  Pain with cross shoulder adduction.  Positive Ortiz Larry.  Positive empty can test.  Positive drop arm test.  Good distal pulses and sensation.            XR Shoulder 2+ View Left    Result Date: 7/27/2023  Narrative: Ordering Provider:  Otilio Thomas MD Ordering Diagnosis/Indication:  Left shoulder pain, unspecified chronicity Procedure:  XR SHOULDER 2+ VW LEFT Exam Date:  7/27/23 COMPARISON:  Not applicable, no relevant images available.     Impression:  3 views of the left shoulder show acceptable position and alignment with no evidence of acute bony abnormality.  No fracture or dislocation is noted.  Mild arthritic change noted in the AC joint with subclavicular and subacromial spurring.  No acute findings. Otilio Thomas MD 7/27/23         ASSESSMENT:    Diagnoses and all orders for this visit:    Rotator cuff " syndrome of left shoulder  -     MRI shoulder left wo contrast; Future    Acute pain of left shoulder    Hypertension, benign    Gastroesophageal reflux disease without esophagitis    Other orders  -     acetaminophen (TYLENOL) 500 MG tablet; Take 1 tablet by mouth Every 6 (Six) Hours As Needed for Mild Pain.  -     meloxicam (MOBIC) 15 MG tablet; 1 PO Daily with food.          PLAN    Patient has an acute injury at which time he began having pain in his left shoulder.  He has pain with activity.  Pain is worse at night.  He has limited range of motion and limited strength.  Exam findings are consistent with rotator cuff pathology.  The plan is to proceed with MRI of the left shoulder.  We discussed beginning meloxicam for symptomatic relief.  Continue passive range of motion exercises to try to maintain motion.  Follow-up after the MRI to discuss further treatment options.    Return for recheck for MRI results.    Otilio Thomas MD

## 2023-08-15 ENCOUNTER — HOSPITAL ENCOUNTER (OUTPATIENT)
Dept: MRI IMAGING | Facility: HOSPITAL | Age: 68
Discharge: HOME | End: 2023-08-15
Admitting: ORTHOPAEDIC SURGERY
Payer: MEDICARE

## 2023-08-15 DIAGNOSIS — M75.102 ROTATOR CUFF SYNDROME OF LEFT SHOULDER: ICD-10-CM

## 2023-08-15 PROCEDURE — 73221 MRI JOINT UPR EXTREM W/O DYE: CPT

## 2023-08-22 ENCOUNTER — OFFICE VISIT (OUTPATIENT)
Dept: ORTHOPEDIC SURGERY | Facility: CLINIC | Age: 68
End: 2023-08-22
Payer: MEDICARE

## 2023-08-22 VITALS — WEIGHT: 191 LBS | HEIGHT: 70 IN | BODY MASS INDEX: 27.35 KG/M2

## 2023-08-22 DIAGNOSIS — M25.512 ACUTE PAIN OF LEFT SHOULDER: ICD-10-CM

## 2023-08-22 DIAGNOSIS — K21.9 GASTROESOPHAGEAL REFLUX DISEASE WITHOUT ESOPHAGITIS: ICD-10-CM

## 2023-08-22 DIAGNOSIS — M75.102 ROTATOR CUFF SYNDROME OF LEFT SHOULDER: Primary | ICD-10-CM

## 2023-08-22 DIAGNOSIS — I10 HYPERTENSION, BENIGN: ICD-10-CM

## 2023-08-22 PROCEDURE — 1159F MED LIST DOCD IN RCRD: CPT | Performed by: ORTHOPAEDIC SURGERY

## 2023-08-22 PROCEDURE — 99213 OFFICE O/P EST LOW 20 MIN: CPT | Performed by: ORTHOPAEDIC SURGERY

## 2023-08-22 PROCEDURE — 1160F RVW MEDS BY RX/DR IN RCRD: CPT | Performed by: ORTHOPAEDIC SURGERY

## 2023-08-22 NOTE — PROGRESS NOTES
"Barron Ackerman is a 68 y.o. male returns for     Chief Complaint   Patient presents with    Left Shoulder - Follow-up, Pain    Results     Mri left shoulder done on 8/15/2023       HISTORY OF PRESENT ILLNESS:  Patient has been been having worsening pain over the last 5 months.  He had a steroid injection previously.  He reports no new injury.  He had an MRI recently.  He continues having pain at night and pain with activity.  He is continuing to work but he is having difficulty doing many of the activities required for work.       CONCURRENT MEDICAL HISTORY:    The following portions of the patient's history were reviewed and updated as appropriate: allergies, current medications, past family history, past medical history, past social history, past surgical history and problem list.     ROS  No fevers or chills.  No chest pain or shortness of air.  No GI or  disturbances.    PHYSICAL EXAMINATION:       Ht 177.8 cm (70\")   Wt 86.6 kg (191 lb)   BMI 27.41 kg/mý     Physical Exam  Constitutional:       General: He is not in acute distress.     Appearance: Normal appearance.   Pulmonary:      Effort: Pulmonary effort is normal. No respiratory distress.   Neurological:      Mental Status: He is alert and oriented to person, place, and time.         Left Shoulder Exam      Comments:  No tenderness on exam.  Shoulder flexion 90 degrees (120 degrees with assistance).  Shoulder abduction 60 degrees (120 degrees with assistance).  Pain with cross shoulder adduction.  Positive Ortiz Larry.  Positive empty can test.  Positive drop arm test.  Good distal pulses and sensation.            XR Shoulder 2+ View Left    Result Date: 7/27/2023  Narrative: Ordering Provider:  Otliio Thomas MD Ordering Diagnosis/Indication:  Left shoulder pain, unspecified chronicity Procedure:  XR SHOULDER 2+ VW LEFT Exam Date:  7/27/23 COMPARISON:  Not applicable, no relevant images available.     Impression:  3 views of the left " shoulder show acceptable position and alignment with no evidence of acute bony abnormality.  No fracture or dislocation is noted.  Mild arthritic change noted in the AC joint with subclavicular and subacromial spurring.  No acute findings. Otilio Thomas MD 7/27/23    MRI Shoulder Left Without Contrast    Result Date: 8/15/2023  Narrative: HISTORY: Shoulder pain after injury. Evaluate for rotator cuff tear. COMPARISON: Left shoulder radiograph 07/27/2023 TECHNIQUE: Axial fat-suppressed PD, coronal fat-suppressed T2, sagittal fat-suppressed T2, sagittal T1, and oblique sagittal fat-suppressed T2 imaging through the left shoulder. FINDINGS: Rotator cuff tendons and muscles:Partial-thickness, articular side tear of the distal supraspinatus tendon as seen best on coronal image 9. There is a small amount of edema also within the infraspinatus near the myotendinous junction. Subscapularis is intact. Biceps:The long head biceps is intact and properly located within the bicipital groove. The biceps anchor appears normal. Subacromial-subdeltoid bursa: Trace fluid in the subacromial bursa. AC joint: Type II acromion. Moderate AC joint osteoarthritis. Shoulder joint: No significant shoulder joint effusion. No focal labral tear. Diffuse posterior labral blunting. Normal articulation without focal cartilage abnormality. Bone marrow: Normal marrow signal.     Impression: 1.  Partial-thickness, articular side tear of the distal supraspinatus tendon involves about 75% of the thickness of the tendon but measures less than 1 cm in size. 2. Moderate AC joint osteoarthrosis.           ASSESSMENT:    Diagnoses and all orders for this visit:    Rotator cuff syndrome of left shoulder    Acute pain of left shoulder    Hypertension, benign    Gastroesophageal reflux disease without esophagitis          PLAN    The MRI and results were reviewed with the patient.  He has a partial-thickness tear of the rotator cuff.  The  partial-thickness tear appears to be high-grade.  He currently is doing the functions of his job but he is having pain doing so.  We discussed the possibility of arthroscopy of the left shoulder.  He is not quite ready for surgical intervention and will continue with home exercises.  He states that if his symptoms fail to improve or worsen that he would consider surgical intervention.    Return in about 8 weeks (around 10/17/2023) for recheck.    Otilio Thomas MD

## 2023-09-26 ENCOUNTER — OFFICE VISIT (OUTPATIENT)
Dept: ORTHOPEDIC SURGERY | Facility: CLINIC | Age: 68
End: 2023-09-26
Payer: MEDICARE

## 2023-09-26 VITALS — HEIGHT: 70 IN | WEIGHT: 195.2 LBS | BODY MASS INDEX: 27.94 KG/M2

## 2023-09-26 DIAGNOSIS — M25.512 ACUTE PAIN OF LEFT SHOULDER: Primary | ICD-10-CM

## 2023-09-26 DIAGNOSIS — M75.102 ROTATOR CUFF SYNDROME OF LEFT SHOULDER: ICD-10-CM

## 2023-09-26 DIAGNOSIS — K21.9 GASTROESOPHAGEAL REFLUX DISEASE WITHOUT ESOPHAGITIS: ICD-10-CM

## 2023-09-26 PROCEDURE — 99213 OFFICE O/P EST LOW 20 MIN: CPT | Performed by: ORTHOPAEDIC SURGERY

## 2023-09-26 PROCEDURE — 1160F RVW MEDS BY RX/DR IN RCRD: CPT | Performed by: ORTHOPAEDIC SURGERY

## 2023-09-26 PROCEDURE — 1159F MED LIST DOCD IN RCRD: CPT | Performed by: ORTHOPAEDIC SURGERY

## 2023-09-26 NOTE — PROGRESS NOTES
"Barron Ackerman is a 68 y.o. male returns for     Chief Complaint   Patient presents with    Left Shoulder - Follow-up, Pain       HISTORY OF PRESENT ILLNESS:  Having some bad days.  Difficulty with some activity.  Worse when about to rain.  Sleeping better at night.  Occasional sharp pains and occasional severe pain at night.     CONCURRENT MEDICAL HISTORY:    The following portions of the patient's history were reviewed and updated as appropriate: allergies, current medications, past family history, past medical history, past social history, past surgical history and problem list.     ROS  No fevers or chills.  No chest pain or shortness of air.  No GI or  disturbances.    PHYSICAL EXAMINATION:       Ht 177.8 cm (70\")   Wt 88.5 kg (195 lb 3.2 oz)   BMI 28.01 kg/m²     Physical Exam  Constitutional:       General: He is not in acute distress.     Appearance: Normal appearance.   Pulmonary:      Effort: Pulmonary effort is normal. No respiratory distress.   Neurological:      Mental Status: He is alert and oriented to person, place, and time.           Left Shoulder Exam      Comments:  No tenderness on exam.  Shoulder flexion 140 degrees (160degrees with assistance).  Shoulder abduction 110 degrees (160 degrees with assistance).  Pain with cross shoulder adduction.  Positive Ortiz Larry.  Positive empty can test.  Positive drop arm test.  Good distal pulses and sensation.            HISTORY:  Shoulder pain after injury. Evaluate for rotator cuff tear.     COMPARISON:  Left shoulder radiograph 07/27/2023     TECHNIQUE:  Axial fat-suppressed PD, coronal fat-suppressed T2, sagittal fat-suppressed T2,  sagittal T1, and oblique sagittal fat-suppressed T2 imaging through the left  shoulder.     FINDINGS:  Rotator cuff tendons and muscles:Partial-thickness, articular side tear of the  distal supraspinatus tendon as seen best on coronal image 9. There is a small  amount of edema also within the infraspinatus near " the myotendinous junction.  Subscapularis is intact.     Biceps:The long head biceps is intact and properly located within the bicipital  groove. The biceps anchor appears normal.     Subacromial-subdeltoid bursa: Trace fluid in the subacromial bursa.     AC joint: Type II acromion. Moderate AC joint osteoarthritis.     Shoulder joint: No significant shoulder joint effusion. No focal labral tear.  Diffuse posterior labral blunting. Normal articulation without focal cartilage  abnormality.     Bone marrow: Normal marrow signal.     IMPRESSION:  1.  Partial-thickness, articular side tear of the distal supraspinatus tendon  involves about 75% of the thickness of the tendon but measures less than 1 cm in  size.     2. Moderate AC joint osteoarthrosis.    Ordering Provider:  Otilio Thomas MD  Ordering Diagnosis/Indication:  Left shoulder pain, unspecified chronicity     Procedure:  XR SHOULDER 2+ VW LEFT  Exam Date:  7/27/23     COMPARISON:  Not applicable, no relevant images available.     IMPRESSION: 3 views of the left shoulder show acceptable position and alignment with no evidence of acute bony abnormality.  No fracture or dislocation is noted.  Mild arthritic change noted in the AC joint with subclavicular and subacromial spurring.  No acute findings.        Otilio Thomas MD  7/27/23          ASSESSMENT:    Diagnoses and all orders for this visit:    Acute pain of left shoulder    Rotator cuff syndrome of left shoulder    Gastroesophageal reflux disease without esophagitis          PLAN    Continue HEP  Continue NSAIDS  Slowly progress as tolerated.  Again discussed possible surgical intervention but he is currently improving.  Discussed possible inject as well.  F/u 6-8 weeks.    Return in about 8 weeks (around 11/21/2023) for recheck.    Otilio Thomas MD

## 2024-07-18 NOTE — TELEPHONE ENCOUNTER
Ni,  This patient does have a PCP a Nayely Zambrano. I can't get it to decline the rx.    
Patient does not have a PCP & was recently in the hospital  
Patient designates spouse Arturo Gill to make medical decisions in case of an emergency/No